# Patient Record
Sex: FEMALE | Race: OTHER | URBAN - METROPOLITAN AREA
[De-identification: names, ages, dates, MRNs, and addresses within clinical notes are randomized per-mention and may not be internally consistent; named-entity substitution may affect disease eponyms.]

---

## 2022-03-19 ENCOUNTER — INPATIENT (INPATIENT)
Facility: HOSPITAL | Age: 52
LOS: 1 days | Discharge: HOME | End: 2022-03-21
Attending: PSYCHIATRY & NEUROLOGY | Admitting: PSYCHIATRY & NEUROLOGY
Payer: COMMERCIAL

## 2022-03-19 VITALS
OXYGEN SATURATION: 100 % | HEART RATE: 70 BPM | WEIGHT: 160.06 LBS | DIASTOLIC BLOOD PRESSURE: 76 MMHG | SYSTOLIC BLOOD PRESSURE: 120 MMHG | TEMPERATURE: 98 F | RESPIRATION RATE: 18 BRPM

## 2022-03-19 DIAGNOSIS — Z78.9 OTHER SPECIFIED HEALTH STATUS: Chronic | ICD-10-CM

## 2022-03-19 LAB
ALBUMIN SERPL ELPH-MCNC: 4.7 G/DL — SIGNIFICANT CHANGE UP (ref 3.5–5.2)
ALP SERPL-CCNC: 75 U/L — SIGNIFICANT CHANGE UP (ref 30–115)
ALT FLD-CCNC: 14 U/L — SIGNIFICANT CHANGE UP (ref 0–41)
ANION GAP SERPL CALC-SCNC: 13 MMOL/L — SIGNIFICANT CHANGE UP (ref 7–14)
APPEARANCE UR: CLEAR — SIGNIFICANT CHANGE UP
APTT BLD: 32 SEC — SIGNIFICANT CHANGE UP (ref 27–39.2)
AST SERPL-CCNC: 15 U/L — SIGNIFICANT CHANGE UP (ref 0–41)
BACTERIA # UR AUTO: NEGATIVE — SIGNIFICANT CHANGE UP
BASOPHILS # BLD AUTO: 0.03 K/UL — SIGNIFICANT CHANGE UP (ref 0–0.2)
BASOPHILS NFR BLD AUTO: 0.5 % — SIGNIFICANT CHANGE UP (ref 0–1)
BILIRUB SERPL-MCNC: 0.4 MG/DL — SIGNIFICANT CHANGE UP (ref 0.2–1.2)
BILIRUB UR-MCNC: NEGATIVE — SIGNIFICANT CHANGE UP
BUN SERPL-MCNC: 14 MG/DL — SIGNIFICANT CHANGE UP (ref 10–20)
CALCIUM SERPL-MCNC: 9.6 MG/DL — SIGNIFICANT CHANGE UP (ref 8.5–10.1)
CHLORIDE SERPL-SCNC: 104 MMOL/L — SIGNIFICANT CHANGE UP (ref 98–110)
CO2 SERPL-SCNC: 24 MMOL/L — SIGNIFICANT CHANGE UP (ref 17–32)
COLOR SPEC: SIGNIFICANT CHANGE UP
CREAT SERPL-MCNC: 0.7 MG/DL — SIGNIFICANT CHANGE UP (ref 0.7–1.5)
DIFF PNL FLD: ABNORMAL
EGFR: 105 ML/MIN/1.73M2 — SIGNIFICANT CHANGE UP
EOSINOPHIL # BLD AUTO: 0.12 K/UL — SIGNIFICANT CHANGE UP (ref 0–0.7)
EOSINOPHIL NFR BLD AUTO: 1.9 % — SIGNIFICANT CHANGE UP (ref 0–8)
EPI CELLS # UR: 5 /HPF — SIGNIFICANT CHANGE UP (ref 0–5)
GLUCOSE SERPL-MCNC: 97 MG/DL — SIGNIFICANT CHANGE UP (ref 70–99)
GLUCOSE UR QL: NEGATIVE — SIGNIFICANT CHANGE UP
HCT VFR BLD CALC: 42.8 % — SIGNIFICANT CHANGE UP (ref 37–47)
HGB BLD-MCNC: 13.9 G/DL — SIGNIFICANT CHANGE UP (ref 12–16)
HYALINE CASTS # UR AUTO: 4 /LPF — SIGNIFICANT CHANGE UP (ref 0–7)
IMM GRANULOCYTES NFR BLD AUTO: 0.3 % — SIGNIFICANT CHANGE UP (ref 0.1–0.3)
INR BLD: 1.02 RATIO — SIGNIFICANT CHANGE UP (ref 0.65–1.3)
KETONES UR-MCNC: NEGATIVE — SIGNIFICANT CHANGE UP
LEUKOCYTE ESTERASE UR-ACNC: NEGATIVE — SIGNIFICANT CHANGE UP
LYMPHOCYTES # BLD AUTO: 1.47 K/UL — SIGNIFICANT CHANGE UP (ref 1.2–3.4)
LYMPHOCYTES # BLD AUTO: 22.8 % — SIGNIFICANT CHANGE UP (ref 20.5–51.1)
MCHC RBC-ENTMCNC: 26.6 PG — LOW (ref 27–31)
MCHC RBC-ENTMCNC: 32.5 G/DL — SIGNIFICANT CHANGE UP (ref 32–37)
MCV RBC AUTO: 81.8 FL — SIGNIFICANT CHANGE UP (ref 81–99)
MONOCYTES # BLD AUTO: 0.25 K/UL — SIGNIFICANT CHANGE UP (ref 0.1–0.6)
MONOCYTES NFR BLD AUTO: 3.9 % — SIGNIFICANT CHANGE UP (ref 1.7–9.3)
NEUTROPHILS # BLD AUTO: 4.57 K/UL — SIGNIFICANT CHANGE UP (ref 1.4–6.5)
NEUTROPHILS NFR BLD AUTO: 70.6 % — SIGNIFICANT CHANGE UP (ref 42.2–75.2)
NITRITE UR-MCNC: NEGATIVE — SIGNIFICANT CHANGE UP
NRBC # BLD: 0 /100 WBCS — SIGNIFICANT CHANGE UP (ref 0–0)
PH UR: 7.5 — SIGNIFICANT CHANGE UP (ref 5–8)
PLATELET # BLD AUTO: 273 K/UL — SIGNIFICANT CHANGE UP (ref 130–400)
POTASSIUM SERPL-MCNC: 4 MMOL/L — SIGNIFICANT CHANGE UP (ref 3.5–5)
POTASSIUM SERPL-SCNC: 4 MMOL/L — SIGNIFICANT CHANGE UP (ref 3.5–5)
PROT SERPL-MCNC: 7.3 G/DL — SIGNIFICANT CHANGE UP (ref 6–8)
PROT UR-MCNC: NEGATIVE — SIGNIFICANT CHANGE UP
PROTHROM AB SERPL-ACNC: 11.7 SEC — SIGNIFICANT CHANGE UP (ref 9.95–12.87)
RBC # BLD: 5.23 M/UL — SIGNIFICANT CHANGE UP (ref 4.2–5.4)
RBC # FLD: 13.3 % — SIGNIFICANT CHANGE UP (ref 11.5–14.5)
RBC CASTS # UR COMP ASSIST: 2 /HPF — SIGNIFICANT CHANGE UP (ref 0–4)
SARS-COV-2 RNA SPEC QL NAA+PROBE: SIGNIFICANT CHANGE UP
SODIUM SERPL-SCNC: 141 MMOL/L — SIGNIFICANT CHANGE UP (ref 135–146)
SP GR SPEC: 1.01 — SIGNIFICANT CHANGE UP (ref 1.01–1.03)
UROBILINOGEN FLD QL: SIGNIFICANT CHANGE UP
WBC # BLD: 6.46 K/UL — SIGNIFICANT CHANGE UP (ref 4.8–10.8)
WBC # FLD AUTO: 6.46 K/UL — SIGNIFICANT CHANGE UP (ref 4.8–10.8)
WBC UR QL: 4 /HPF — SIGNIFICANT CHANGE UP (ref 0–5)

## 2022-03-19 PROCEDURE — 70498 CT ANGIOGRAPHY NECK: CPT | Mod: 26,MA

## 2022-03-19 PROCEDURE — 70496 CT ANGIOGRAPHY HEAD: CPT | Mod: 26,MA

## 2022-03-19 PROCEDURE — 99283 EMERGENCY DEPT VISIT LOW MDM: CPT

## 2022-03-19 PROCEDURE — 99220: CPT | Mod: FS

## 2022-03-19 RX ORDER — ACETAMINOPHEN 500 MG
975 TABLET ORAL ONCE
Refills: 0 | Status: COMPLETED | OUTPATIENT
Start: 2022-03-19 | End: 2022-03-19

## 2022-03-19 RX ORDER — KETOROLAC TROMETHAMINE 30 MG/ML
15 SYRINGE (ML) INJECTION ONCE
Refills: 0 | Status: DISCONTINUED | OUTPATIENT
Start: 2022-03-19 | End: 2022-03-19

## 2022-03-19 RX ORDER — SODIUM CHLORIDE 9 MG/ML
1000 INJECTION, SOLUTION INTRAVENOUS
Refills: 0 | Status: DISCONTINUED | OUTPATIENT
Start: 2022-03-19 | End: 2022-03-21

## 2022-03-19 RX ADMIN — SODIUM CHLORIDE 500 MILLILITER(S): 9 INJECTION, SOLUTION INTRAVENOUS at 13:03

## 2022-03-19 RX ADMIN — Medication 15 MILLIGRAM(S): at 20:16

## 2022-03-19 RX ADMIN — Medication 975 MILLIGRAM(S): at 15:30

## 2022-03-19 RX ADMIN — Medication 975 MILLIGRAM(S): at 16:30

## 2022-03-19 RX ADMIN — Medication 15 MILLIGRAM(S): at 20:01

## 2022-03-19 NOTE — ED ADULT NURSE NOTE - OBJECTIVE STATEMENT
pt came to ed c/o of pain in both eyes  that has lasted for a while. last night pt started to have n/v with the pain . stated had fevers in the week as well.

## 2022-03-19 NOTE — ED PROVIDER NOTE - PHYSICAL EXAMINATION
CONSTITUTIONAL: NAD  SKIN: Warm dry  HEAD: NCAT  EYES: NL inspection, no conjunctival erythema, 20/100 vision b/l, 14mm IOP rt, 9mm IOP left.   ENT: MMM  NECK: Supple; non tender.  CARD: RRR  RESP: CTAB  ABD: S/NT no R/G  EXT: no pedal edema  NEURO: Grossly unremarkable  PSYCH: Cooperative, appropriate.

## 2022-03-19 NOTE — CONSULT NOTE ADULT - SUBJECTIVE AND OBJECTIVE BOX
Neurology Consult    Patient is a 52y old  Female who presents with a chief complaint of bilateral eye pain    HPI: 52 year old F lady with PMH of psoriasis, elevated GUERLINE, following with rheumatology recently, presented for intermitted bilateral eye pain.  Symptoms has been going on for almost a year, worsening over the last 2 months ago. Patient follows with ophthalmologist Dr. Belcher at [facility], started on Flarex eye drops 0.1 %. This morning, patient's pain was very severe, woke her up from sleep 20/10 in intensity associated with nausea headache, and diplopia. She also had bilateral numbness over cheeks and the tip of her  nose.  When seen in ED, pain is better, 8/10 in intensity, associated with photophobia. Patient also mentioned she feels her vision was blurry, and her ophthalmologist attribute that to Flarex which she was tapering over the last 2 months.  She denies weakness, tingling, numbness, gait changes, bowel or bladder symptoms.      PAST MEDICAL & SURGICAL HISTORY:  Psoriasis        FAMILY HISTORY:  None      Social History: (-) x 3    Allergies    penicillin (Unknown)    Intolerances        MEDICATIONS  (STANDING):  lactated ringers. 1000 milliLiter(s) (500 mL/Hr) IV Continuous <Continuous>    MEDICATIONS  (PRN):      Review of systems:    Constitutional: as per HPI  Eyes: No eye pain or discharge  ENMT:  No difficulty hearing; No sinus or throat pain  Neck: No pain or stiffness  Respiratory: No cough, wheezing, chills or hemoptysis  Cardiovascular: No chest pain, palpitations, shortness of breath, dyspnea on exertion  Gastrointestinal: No abdominal pain, nausea, vomiting or hematemesis; No diarrhea or constipation.   Genitourinary: No dysuria, frequency, hematuria or incontinence  Neurological: As per HPI  Skin: No rashes or lesions   Endocrine: No heat or cold intolerance; No hair loss  Musculoskeletal: No joint pain or swelling  Psychiatric: No depression, anxiety, mood swings  Heme/Lymph: No easy bruising or bleeding gums    Vital Signs Last 24 Hrs  T(C): 36.6 (19 Mar 2022 10:44), Max: 36.6 (19 Mar 2022 10:44)  T(F): 97.9 (19 Mar 2022 10:44), Max: 97.9 (19 Mar 2022 10:44)  HR: 70 (19 Mar 2022 10:44) (70 - 70)  BP: 120/76 (19 Mar 2022 10:44) (120/76 - 120/76)  RR: 18 (19 Mar 2022 10:44) (18 - 18)  SpO2: 100% (19 Mar 2022 10:44) (100% - 100%)      Neurological Examination:  General:  Appearance is consistent with chronologic age.  No abnormal facies.  Gross skin survey within normal limits.    Cognitive/Language:  Awake, alert, and oriented to person, place, time and date.  Recent and remote memory intact.  Fund of knowledge is appropriate.  Naming, repetition and comprehension intact.   Nondysarthric.    Cranial Nerves  - Eyes: Visual acuity intact, Visual fields full.  EOMI w/o nystagmus, skew or reported double vision.  PERRL.  No ptosis/weakness of eyelid closure.    - Face:  Facial sensation normal V1 - 3, no facial asymmetry.    - Ears/Nose/Throat:  Hearing grossly intact b/l to finger rub.  Palate elevates midline.  Tongue and uvula midline.   Motor examination:  Upper Extremities: L 5/5, R 5/5; Lower extremities: L 5/5, R 5/5.  No observable drift. Normal tone and bulk. No tenderness, twitching, tremors or involuntary movements.  Sensory examination:   Intact to light touch and pinprick, pain, temperature and proprioception and vibration in all extremities.  Reflexes:   2+ b/l biceps, triceps, brachioradialis, patella and achilles.  Plantar response downgoing b/l.  Jaw jerk, Sharron, clonus absent.  Cerebellum:   FTN/HKS intact.  No dysmetria or dysdiadokinesia.  Gait narrow based and normal.      Labs:   CBC Full  -  ( 19 Mar 2022 12:00 )  WBC Count : 6.46 K/uL  RBC Count : 5.23 M/uL  Hemoglobin : 13.9 g/dL  Hematocrit : 42.8 %  Platelet Count - Automated : 273 K/uL  Mean Cell Volume : 81.8 fL  Mean Cell Hemoglobin : 26.6 pg  Mean Cell Hemoglobin Concentration : 32.5 g/dL  Auto Neutrophil # : 4.57 K/uL  Auto Lymphocyte # : 1.47 K/uL  Auto Monocyte # : 0.25 K/uL  Auto Eosinophil # : 0.12 K/uL  Auto Basophil # : 0.03 K/uL  Auto Neutrophil % : 70.6 %  Auto Lymphocyte % : 22.8 %  Auto Monocyte % : 3.9 %  Auto Eosinophil % : 1.9 %  Auto Basophil % : 0.5 %        141  |  104  |  14  ----------------------------<  97  4.0   |  24  |  0.7    Ca    9.6      19 Mar 2022 12:00    TPro  7.3  /  Alb  4.7  /  TBili  0.4  /  DBili  x   /  AST  15  /  ALT  14  /  AlkPhos  75      LIVER FUNCTIONS - ( 19 Mar 2022 12:00 )  Alb: 4.7 g/dL / Pro: 7.3 g/dL / ALK PHOS: 75 U/L / ALT: 14 U/L / AST: 15 U/L / GGT: x           PT/INR - ( 19 Mar 2022 12:00 )   PT: 11.70 sec;   INR: 1.02 ratio         PTT - ( 19 Mar 2022 12:00 )  PTT:32.0 sec  Urinalysis Basic - ( 19 Mar 2022 13:00 )    Color: Light Yellow / Appearance: Clear / S.014 / pH: x  Gluc: x / Ketone: Negative  / Bili: Negative / Urobili: <2 mg/dL   Blood: x / Protein: Negative / Nitrite: Negative   Leuk Esterase: Negative / RBC: 2 /HPF / WBC 4 /HPF   Sq Epi: x / Non Sq Epi: 5 /HPF / Bacteria: Negative          Neuroimaging:  NCHCT: CT Head No Cont:   ACC: [id number] EXAM:  CT ANGIO NECK (W) IC                        ACC: [id number] EXAM:  CT ANGIO BRAIN (W) IC                        ACC: [id number] EXAM:  CT BRAIN                          PROCEDURE DATE:  2022          INTERPRETATION:  CLINICAL INDICATION: Diplopia. Ocular pain.    TECHNIQUE: Noncontrast head CT was performed. Sagittal coronal   reformatted images were obtained.    CT angiography of the intracranial (head) and extracranial (neck)   circulation was performed after contrast administration    Maximal intensity projection images were additionally included and   reviewed.    100 mls of Omnipaque 350 was administered intravenously without   complication and 0 mls were discarded.    Using a separate workstation, 3-D shadedsurface reformations were made   of vasculature. 3-D reformations were reviewed and included in   interpretation of the official report.    CAROTID STENOSIS REFERENCE: (NASCET = 100x1-(N/D)). N=greatest narrowing.   D=normal distal diameter - MILD = <50% stenosis. - MODERATE = 50-69%   stenosis. - SEVERE = 70-89% stenosis. - HAIRLINE/CRITICAL = 90-99%   stenosis. - OCCLUDED = 100% stenosis.    COMPARISON: None.    FINDINGS:  CT BRAIN:    There is no evidence for acute intracranial hemorrhage, mass effect or   midline shift.    The basal cisterns are patent. There is no hydrocephalus.    There is no extra-axial collection.    The visualized orbits are unremarkable.    The calvarium is intact, without depressed fracture.    There is moderate mucosal thickening of the right sphenoid sinus, ethmoid   air cells as well as the right frontal sinus. The mastoid air cells are   clear.    HEAD CTA:    The internal carotid arteries demonstrate normal enhancement to the   intracranial circulation andcircle of [place]. Ophthalmic arteries are   patent.    Anterior and middle cerebral arteries demonstrate normal enhancement and   symmetric arborization without intraluminal filling defect, stenosis,   occlusion, aneurysm or vascular malformation.    The intradural vertebral arteries demonstrate normal enhancement to the   vertebrobasilar confluence. Branch vasculature of the posterior   circulation are within normal limits. The posterior cerebral arteries   demonstrate symmetric enhancement and arborization without evidence for   aneurysm, stenosis, occlusion or vascular malformation.    Visualized dural venous sinuses and deep cerebral venous structures   demonstrate normal enhancement without evidence for filling defect.    NECK CTA:    Streak artifact from metallic necklace limits evaluation of the   surrounding structures    The aortic arch and origins of the great vessels are within normal limits.    Right:  The origin of the right common carotid artery is normal. The   right commoncarotid artery is normal in course and caliber to the   carotid bifurcation. Origins of the internal and external carotid   arteries are normal by NASCET criteria. The right internal carotid artery   has normal course and caliber to the intracranial circulation.    The origin of the right vertebral artery is normal. The right PCA is   diminutive along its course and terminates in PICA.    Left: The origin of the left common carotid artery is normal. The left   common carotid artery is normal in course and caliber to the carotid   bifurcation. Origins of the internal and external carotid arteries are   normal by NASCET criteria. The left internal carotid artery has normal   course and caliber to the intracranial circulation.    Vertebral artery originates off the aortic arch and is unremarkable. The   left vertebral artery is normal in course and caliber to the intracranial   circulation.    IMPRESSION:  CT HEAD:  No acute intracranial pathology. No evidence of midline shift, mass   effector intracranial hemorrhage.    CTA HEAD:  No evidence of flow-limiting stenosis, occlusion or aneurysm.    CTA NECK:  No evidence of carotid or vertebral artery stenosis.           Neurology Consult    Patient is a 52y old  Female who presents with a chief complaint of bilateral eye pain    HPI: 52 year old F lady with PMH of psoriasis, elevated GUERLINE, following with rheumatology recently, presented for intermitted bilateral eye pain.  Symptoms has been going on for almost a year, worsening over the last 2 months ago. Patient follows with ophthalmologist Dr. Belcher at [facility], started on Flarex eye drops 0.1 %. This morning, patient's pain was very severe, woke her up from sleep 20/10 in intensity associated with nausea headache, and diplopia. She also had bilateral numbness over cheeks and the tip of her  nose.  When seen in ED, pain is better, 8/10 in intensity, associated with photophobia. Patient also mentioned she feels her vision was blurry, and her ophthalmologist attribute that to Flarex which she was tapering over the last 2 months.  She denies weakness, tingling, numbness, gait changes, bowel or bladder symptoms.      PAST MEDICAL & SURGICAL HISTORY:  Psoriasis        FAMILY HISTORY:  None      Social History: (-) x 3    Allergies    penicillin (Unknown)    Intolerances        MEDICATIONS  (STANDING):  lactated ringers. 1000 milliLiter(s) (500 mL/Hr) IV Continuous <Continuous>    MEDICATIONS  (PRN):      Review of systems:    Constitutional: as per HPI  Eyes: Eye pain  ENMT: sinus pain  Neck: No pain or stiffness  Respiratory: No cough, wheezing, chills or hemoptysis  Cardiovascular: No chest pain, palpitations, shortness of breath, dyspnea on exertion  Gastrointestinal: No abdominal pain, nausea, vomiting or hematemesis; No diarrhea or constipation.   Genitourinary: No dysuria, frequency, hematuria or incontinence  Neurological: As per HPI  Skin: No rashes or lesions   Endocrine: No heat or cold intolerance; No hair loss  Musculoskeletal: No joint pain or swelling  Psychiatric: No depression, anxiety, mood swings  Heme/Lymph: No easy bruising or bleeding gums    Vital Signs Last 24 Hrs  T(C): 36.6 (19 Mar 2022 10:44), Max: 36.6 (19 Mar 2022 10:44)  T(F): 97.9 (19 Mar 2022 10:44), Max: 97.9 (19 Mar 2022 10:44)  HR: 70 (19 Mar 2022 10:44) (70 - 70)  BP: 120/76 (19 Mar 2022 10:44) (120/76 - 120/76)  RR: 18 (19 Mar 2022 10:44) (18 - 18)  SpO2: 100% (19 Mar 2022 10:44) (100% - 100%)      Neurological Examination:  General:  Appearance is consistent with chronologic age.  No abnormal facies.  Gross skin survey within normal limits.    Cognitive/Language:  Awake, alert, and oriented to person, place, time and date.  Recent and remote memory intact.  Fund of knowledge is appropriate.  Naming, repetition and comprehension intact.   Nondysarthric.    Cranial Nerves  - Eyes: Visual acuity intact, Visual fields full.  EOMI w/o nystagmus, skew or reported double vision.  PERRL.  No ptosis/weakness of eyelid closure.  APD +ve to dim light  - Face:  Facial sensation normal V1 - 3, no facial asymmetry.    - Ears/Nose/Throat:  Hearing grossly intact b/l to finger rub.  Palate elevates midline.  Tongue and uvula midline.   Motor examination:  Upper Extremities: L 5/5, R 5/5; Lower extremities: L 5/5, R 5/5.  No observable drift. Normal tone and bulk. No tenderness, twitching, tremors or involuntary movements.  Sensory examination:   Intact to light touch and pinprick, pain, temperature and proprioception and vibration in all extremities.  Reflexes:   2+ b/l biceps, triceps, brachioradialis, patella and achilles.  Plantar response downgoing b/l.  Jaw jerk, Sharron, clonus absent.  Cerebellum:   FTN/HKS intact.  No dysmetria or dysdiadokinesia.  Gait narrow based and normal.      Labs:   CBC Full  -  ( 19 Mar 2022 12:00 )  WBC Count : 6.46 K/uL  RBC Count : 5.23 M/uL  Hemoglobin : 13.9 g/dL  Hematocrit : 42.8 %  Platelet Count - Automated : 273 K/uL  Mean Cell Volume : 81.8 fL  Mean Cell Hemoglobin : 26.6 pg  Mean Cell Hemoglobin Concentration : 32.5 g/dL  Auto Neutrophil # : 4.57 K/uL  Auto Lymphocyte # : 1.47 K/uL  Auto Monocyte # : 0.25 K/uL  Auto Eosinophil # : 0.12 K/uL  Auto Basophil # : 0.03 K/uL  Auto Neutrophil % : 70.6 %  Auto Lymphocyte % : 22.8 %  Auto Monocyte % : 3.9 %  Auto Eosinophil % : 1.9 %  Auto Basophil % : 0.5 %        141  |  104  |  14  ----------------------------<  97  4.0   |  24  |  0.7    Ca    9.6      19 Mar 2022 12:00    TPro  7.3  /  Alb  4.7  /  TBili  0.4  /  DBili  x   /  AST  15  /  ALT  14  /  AlkPhos  75      LIVER FUNCTIONS - ( 19 Mar 2022 12:00 )  Alb: 4.7 g/dL / Pro: 7.3 g/dL / ALK PHOS: 75 U/L / ALT: 14 U/L / AST: 15 U/L / GGT: x           PT/INR - ( 19 Mar 2022 12:00 )   PT: 11.70 sec;   INR: 1.02 ratio         PTT - ( 19 Mar 2022 12:00 )  PTT:32.0 sec  Urinalysis Basic - ( 19 Mar 2022 13:00 )    Color: Light Yellow / Appearance: Clear / S.014 / pH: x  Gluc: x / Ketone: Negative  / Bili: Negative / Urobili: <2 mg/dL   Blood: x / Protein: Negative / Nitrite: Negative   Leuk Esterase: Negative / RBC: 2 /HPF / WBC 4 /HPF   Sq Epi: x / Non Sq Epi: 5 /HPF / Bacteria: Negative          Neuroimaging:  NCHCT: CT Head No Cont:   ACC: [id number] EXAM:  CT ANGIO NECK (W) IC                        ACC: [id number] EXAM:  CT ANGIO BRAIN (W)[facility]                        ACC: [id number] EXAM:  CT BRAIN                          PROCEDURE DATE:  2022          INTERPRETATION:  CLINICAL INDICATION: Diplopia. Ocular pain.    TECHNIQUE: Noncontrast head CT was performed. Sagittal coronal   reformatted images were obtained.    CT angiography of the intracranial (head) and extracranial (neck)   circulation was performed after contrast administration    Maximal intensity projection images were additionally included and   reviewed.    100 mls of Omnipaque 350 was administered intravenously without   complication and 0 mls were discarded.    Using a separate workstation, 3-D shadedsurface reformations were made   of vasculature. 3-D reformations were reviewed and included in   interpretation of the official report.    CAROTID STENOSIS REFERENCE: (NASCET = 100x1-(N/D)). N=greatest narrowing.   D=normal distal diameter - MILD = <50% stenosis. - MODERATE = 50-69%   stenosis. - SEVERE = 70-89% stenosis. - HAIRLINE/CRITICAL = 90-99%   stenosis. - OCCLUDED = 100% stenosis.    COMPARISON: None.    FINDINGS:  CT BRAIN:    There is no evidence for acute intracranial hemorrhage, mass effect or   midline shift.    The basal cisterns are patent. There is no hydrocephalus.    There is no extra-axial collection.    The visualized orbits are unremarkable.    The calvarium is intact, without depressed fracture.    There is moderate mucosal thickening of the right sphenoid sinus, ethmoid   air cells as well as the right frontal sinus. The mastoid air cells are   clear.    HEAD CTA:    The internal carotid arteries demonstrate normal enhancement to the   intracranial circulation andcircle of [place]. Ophthalmic arteries are   patent.    Anterior and middle cerebral arteries demonstrate normal enhancement and   symmetric arborization without intraluminal filling defect, stenosis,   occlusion, aneurysm or vascular malformation.    The intradural vertebral arteries demonstrate normal enhancement to the   vertebrobasilar confluence. Branch vasculature of the posterior   circulation are within normal limits. The posterior cerebral arteries   demonstrate symmetric enhancement and arborization without evidence for   aneurysm, stenosis, occlusion or vascular malformation.    Visualized dural venous sinuses and deep cerebral venous structures   demonstrate normal enhancement without evidence for filling defect.    NECK CTA:    Streak artifact from metallic necklace limits evaluation of the   surrounding structures    The aortic arch and origins of the great vessels are within normal limits.    Right:  The origin of the right common carotid artery is normal. The   right commoncarotid artery is normal in course and caliber to the   carotid bifurcation. Origins of the internal and external carotid   arteries are normal by NASCET criteria. The right internal carotid artery   has normal course and caliber to the intracranial circulation.    The origin of the right vertebral artery is normal. The right PCA is   diminutive along its course and terminates in PICA.    Left: The origin of the left common carotid artery is normal. The left   common carotid artery is normal in course and caliber to the carotid   bifurcation. Origins of the internal and external carotid arteries are   normal by NASCET criteria. The left internal carotid artery has normal   course and caliber to the intracranial circulation.    Vertebral artery originates off the aortic arch and is unremarkable. The   left vertebral artery is normal in course and caliber to the intracranial   circulation.    IMPRESSION:  CT HEAD:  No acute intracranial pathology. No evidence of midline shift, mass   effector intracranial hemorrhage.    CTA HEAD:  No evidence of flow-limiting stenosis, occlusion or aneurysm.    CTA NECK:  No evidence of carotid or vertebral artery stenosis.

## 2022-03-19 NOTE — ED CDU PROVIDER INITIAL DAY NOTE - PROGRESS NOTE DETAILS
received signout from Iban Biggs - pt in obs for bilat eye pain x 1 yr worse x 2 months, now with diplopia; neurology consulted - recommend mri of brain and orbits to r/o optic neuritis;  ct head, cta head/neck neg;

## 2022-03-19 NOTE — ED CDU PROVIDER INITIAL DAY NOTE - ATTENDING CONTRIBUTION TO CARE
Pt presents with a one year history of intermittent pain to the eyes and periorbital area. Pt was seen by opthalmology and treated with eye drops. Currently using Flarex without benefit. NO other neuro complaints On exam S1S2 rrr, no rash, pain on EOM to both eyes.

## 2022-03-19 NOTE — ED CDU PROVIDER INITIAL DAY NOTE - NSICDXFAMILYHX_GEN_ALL_CORE_FT
FAMILY HISTORY:  Father  Still living? No  FH: CAD (coronary artery disease), Age at diagnosis: Age Unknown    Mother  Still living? No  FH: CAD (coronary artery disease), Age at diagnosis: Age Unknown

## 2022-03-19 NOTE — CONSULT NOTE ADULT - ASSESSMENT
52 year old F lady with PMH of psoriasis, elevated GUERLINE, following with rheumatology recently, presented for intermitted bilateral eye pain.  Symptoms has been going on for almost a year, worsening over the last 2 months ago. Patient follows with ophthalmologist Dr. Belcher at [facility], started on Flarex eye drops 0.1 %. This morning, patient's pain was very severe, woke her up from sleep 20/10 in intensity associated with nausea headache, and diplopia. She also had bilateral numbness over cheeks and the tip of her  nose.  When seen in ED, pain is better, 8/10 in intensity, associated with photophobia. Patient also mentioned she feels her vision was blurry, and her ophthalmologist attribute that to Flarex which she was tapering over the last 2 months.  She denies weakness, tingling, numbness, gait changes, bowel or bladder symptoms.    B/l intermittent eye pain- R/o optic neuritis  -         52 year old F lady with PMH of psoriasis, elevated GUERLINE, following with rheumatology recently, presented for intermitted bilateral eye pain, worsening over the last 2 months ago, associated with nausea headache, and diplopia. She also had bilateral numbness over cheeks and the tip of her  nose.  Exam notable to     B/l intermittent eye pain- R/o optic neuritis  -         52 year old F lady with PMH of psoriasis, elevated GUERLINE, following with rheumatology recently, presented for intermitted bilateral eye pain, worsening over the last 2 months ago, associated with nausea headache, and diplopia. She also had bilateral numbness over cheeks and the tip of her  nose.  Exam notable to to +APD, otherwise unremarkable    B/l intermittent eye pain  - R/o optic neuritis  -Admit to observation unit  -MRI brain w/wo contrast  -MR orbit w/wo contrast    Plan discussed with attending and ED team

## 2022-03-19 NOTE — ED ADULT NURSE REASSESSMENT NOTE - NS ED NURSE REASSESS COMMENT FT1
Pt assessed A&Ox4, VSS. Pt complains of B/L eye pain, denies nausea. PA Fearns notified and aware. Pt resting on stretcher, awaiting MRI. Safety and comfort measures maintained. Will continue to monitor

## 2022-03-19 NOTE — ED PROVIDER NOTE - OBJECTIVE STATEMENT
52 y f, no pmh, pw bl eye pain. Started 1 year ago, intermittent, becoming constant 2 months ago, sees ophthalmologist Dr. Belcher at [facility]. yesterday, pain is much worse than before, 10/10, ass w nausea and headache, +diplopia, but EOMI.  No numbness, weakness, tingling, parasthesia. Denies f/c, cp, sob, n/v/d, abd pain dysuria. of note, pt is GUERLINE positive. Sees a rheumatologist but does not have definitive diagnosis.

## 2022-03-19 NOTE — ED PROVIDER NOTE - PROGRESS NOTE DETAILS
Maribell Dill endorsed pt can f/u outpatient. BG: Pt reassessed. Pt. stable and comfortable in bed. BG: Pt reassessed. Pt. stable and comfortable in bed. Still endorses pain. Wants something for pain BG: Pt reassessed. Pt. stable and comfortable in bed. neuro endorsed to put pt in obs for MRI

## 2022-03-19 NOTE — ED PROVIDER NOTE - ATTENDING CONTRIBUTION TO CARE
52F pmh HL, bilat eye pain x >1 yr follows w ophtho Dr Robertson and rheum Dr Liu, on florex eye drops p/w worsening bilat eye pain since last night. associated w 1 episode nbnb emesis. eye pain radiates to Head. no numbness, weakness, tingling. no blurry vision, slurred speech, trouble walking. no cp, sob. no fever, cough, uri.    on exam, AFVSS, well miguel nad, ncat, eomi, perrla, mmm, lctab, rrr nl s1s2 no mrg, abd soft ntnd, aaox3, CN 2-12 intact, No nystagmus.  5/5 motor x 4 ext, SILT x 4 extremities, No facial droop or slurred speech. No pronator drift.  Normal rapid alternating movement and finger nose finger bilaterally. No midline C/T/L tenderness to palpation or step off. Normal gait, No ataxia.   no le edema or calf ttp,     a/p; Eye pain/HA- will do labs, CT scan, neuro/ophtho c/s re-eval

## 2022-03-19 NOTE — ED CDU PROVIDER INITIAL DAY NOTE - MEDICAL DECISION MAKING DETAILS
Pt presents with bilateral eye pains and blurred vision. CTs head normal. Evaluated by neuro >Advised MRI head

## 2022-03-19 NOTE — ED ADULT TRIAGE NOTE - CHIEF COMPLAINT QUOTE
"im having pain in both eyes , its been happening a while im seeing an eye doctor for it. last night the pain came with nausea and vomiting. for a week khalida been running fevers also"

## 2022-03-19 NOTE — ED CDU PROVIDER INITIAL DAY NOTE - OBJECTIVE STATEMENT
52 y.o. female with pmx of psoriasis, elevated yoshi comes to ed complain of worsening b/l eye pain x 1 year getting worse x 2 months. patient sees dr. villalta ophthalmologist. as per initial ed team mouna norman spoke to opthalmology states outpatient workup, spoke to neurology and evaluated recommended transfer to obs for mri of head and orbit with and without.

## 2022-03-20 PROCEDURE — 70540 MRI ORBIT/FACE/NECK W/O DYE: CPT | Mod: 26,52,ME

## 2022-03-20 PROCEDURE — 70551 MRI BRAIN STEM W/O DYE: CPT | Mod: 26,ME

## 2022-03-20 PROCEDURE — G1004: CPT

## 2022-03-20 PROCEDURE — 99217: CPT | Mod: FS

## 2022-03-20 RX ORDER — ACETAMINOPHEN 500 MG
650 TABLET ORAL EVERY 6 HOURS
Refills: 0 | Status: DISCONTINUED | OUTPATIENT
Start: 2022-03-20 | End: 2022-03-21

## 2022-03-20 RX ORDER — OXYCODONE AND ACETAMINOPHEN 5; 325 MG/1; MG/1
1 TABLET ORAL ONCE
Refills: 0 | Status: DISCONTINUED | OUTPATIENT
Start: 2022-03-20 | End: 2022-03-20

## 2022-03-20 RX ORDER — LANOLIN ALCOHOL/MO/W.PET/CERES
3 CREAM (GRAM) TOPICAL AT BEDTIME
Refills: 0 | Status: DISCONTINUED | OUTPATIENT
Start: 2022-03-20 | End: 2022-03-21

## 2022-03-20 RX ORDER — ONDANSETRON 8 MG/1
4 TABLET, FILM COATED ORAL EVERY 8 HOURS
Refills: 0 | Status: DISCONTINUED | OUTPATIENT
Start: 2022-03-20 | End: 2022-03-21

## 2022-03-20 RX ADMIN — OXYCODONE AND ACETAMINOPHEN 1 TABLET(S): 5; 325 TABLET ORAL at 18:18

## 2022-03-20 RX ADMIN — OXYCODONE AND ACETAMINOPHEN 1 TABLET(S): 5; 325 TABLET ORAL at 04:41

## 2022-03-20 RX ADMIN — OXYCODONE AND ACETAMINOPHEN 1 TABLET(S): 5; 325 TABLET ORAL at 17:41

## 2022-03-20 RX ADMIN — Medication 1 MILLIGRAM(S): at 15:10

## 2022-03-20 NOTE — H&P ADULT - HISTORY OF PRESENT ILLNESS
52 year old F lady with PMH of psoriasis, elevated GUERLINE, following with rheumatology recently, presented for intermitted bilateral eye pain.  Symptoms has been going on for almost a year, worsening over the last 2 months ago. Patient follows with ophthalmologist Dr. Belcher at [facility], started on Flarex eye drops 0.1 %. This morning, patient's pain was very severe, woke her up from sleep 20/10 in intensity associated with nausea headache, and diplopia. She also had bilateral numbness over cheeks and the tip of her  nose.  When seen in ED, pain is better, 8/10 in intensity, associated with photophobia. Patient also mentioned she feels her vision was blurry, and her ophthalmologist attribute that to Flarex which she was tapering over the last 2 months.  She denies weakness, tingling, numbness, gait changes, bowel or bladder symptoms.    in the ED,pt's VS T(C): 36.1 (03-20-22 @ 16:45), Max: 36.5 (03-20-22 @ 00:20)  HR: 65 (03-20-22 @ 16:45) (61 - 71)  BP: 104/64 (03-20-22 @ 16:45) (98/55 - 111/71)  RR: 18 (03-20-22 @ 16:45) (17 - 18)  SpO2: 99% (03-20-22 @ 16:45) (99% - 100%), labs done showed < from: MR Orbit, Face, and/or Neck w/o Cont Disc (03.20.22 @ 16:18) >  Incomplete examination due to patient inability to tolerate full exam.    No evidence of acute intracranial pathology or gross abnormal signal   involving the optic nerves though evaluation of the orbits is suboptimal   without intravenous contrast.    < from: CT Angio Head w/ IV Cont (03.19.22 @ 12:39) >  CT HEAD:  No acute intracranial pathology. No evidence of midline shift, mass   effector intracranial hemorrhage.  CTA HEAD:  No evidence of flow-limiting stenosis, occlusion or aneurysm.  CTA NECK:  No evidence of carotid or vertebral artery stenosis.  pt received lorazepam, oxycodone, ketorolac, 1 liter LR.   pt is admitted for workup/management  52 year old F lady with PMH of psoriasis, elevated GUERLINE, following with rheumatology recently, presented for intermitted bilateral eye pain.  Symptoms has been going on for almost a year, worsening over the last 2 months ago. Patient follows with ophthalmologist Dr. Belcher at [facility], started on Flarex eye drops 0.1 %. This morning, patient's pain was very severe, woke her up from sleep 20/10 in intensity associated with nausea headache, and diplopia. She also had bilateral numbness over cheeks and the tip of her  nose.  When seen in ED, pain is better, 8/10 in intensity, associated with photophobia. Patient also mentioned she feels her vision was blurry, and her ophthalmologist attribute that to Flarex which she was tapering over the last 2 months. her headache was getting better.  She denies weakness, tingling, numbness, gait changes, bowel or bladder symptoms.    in the ED,pt's VS T(C): 36.1 (03-20-22 @ 16:45), Max: 36.5 (03-20-22 @ 00:20)  HR: 65 (03-20-22 @ 16:45) (61 - 71)  BP: 104/64 (03-20-22 @ 16:45) (98/55 - 111/71)  RR: 18 (03-20-22 @ 16:45) (17 - 18)  SpO2: 99% (03-20-22 @ 16:45) (99% - 100%), labs done were unremarkable  from: MR Orbit, Face, and/or Neck w/o Cont Disc (03.20.22 @ 16:18) >  Incomplete examination due to patient inability to tolerate full exam.    No evidence of acute intracranial pathology or gross abnormal signal   involving the optic nerves though evaluation of the orbits is suboptimal   without intravenous contrast.    < from: CT Angio Head w/ IV Cont (03.19.22 @ 12:39) >  CT HEAD:  No acute intracranial pathology. No evidence of midline shift, mass   effector intracranial hemorrhage.  CTA HEAD:  No evidence of flow-limiting stenosis, occlusion or aneurysm.  CTA NECK:  No evidence of carotid or vertebral artery stenosis.  pt received lorazepam, oxycodone, ketorolac, 1 liter LR.   pt is admitted for workup/management

## 2022-03-20 NOTE — ED CDU PROVIDER SUBSEQUENT DAY NOTE - CONDUCTED A DETAILED DISCUSSION WITH PATIENT AND/OR GUARDIAN REGARDING, MDM
lab results/radiology results Bilobed Transposition Flap Text: The defect edges were debeveled with a #15 scalpel blade.  Given the location of the defect and the proximity to free margins a bilobed transposition flap was deemed most appropriate.  Using a sterile surgical marker, an appropriate bilobe flap drawn around the defect.    The area thus outlined was incised deep to adipose tissue with a #15 scalpel blade.  The skin margins were undermined to an appropriate distance in all directions utilizing iris scissors.

## 2022-03-20 NOTE — H&P ADULT - ASSESSMENT
52 year old F lady with PMH of psoriasis, elevated GUERLINE, following with rheumatology recently, presented for intermitted bilateral eye pain.  Symptoms has been going on for almost a year, worsening over the last 2 months ago. Patient follows with ophthalmologist Dr. Belcher at [facility], started on Flarex eye drops 0.1 %. This morning, patient's pain was very severe, woke her up from sleep 20/10 in intensity associated with nausea headache, and diplopia. She also had bilateral numbness over cheeks and the tip of her  nose.  When seen in ED, pain is better, 8/10 in intensity, associated with photophobia. Patient also mentioned she feels her vision was blurry, and her ophthalmologist attribute that to Flarex which she was tapering over the last 2 months. her headache was getting better.  She denies weakness, tingling, numbness, gait changes, bowel or bladder symptoms.    #B/l intermittent eye pain:  # R/o optic neuritis  - labs done were unremarkable   -MR Orbit, Face, and/or Neck w/o Cont Disc (03.20.22 @ 16:18) >  Incomplete examination due to patient inability to tolerate full exam.  No evidence of acute intracranial pathology or gross abnormal signal   involving the optic nerves though evaluation of the orbits is suboptimal   without intravenous contrast.  -CT Angio Head w/ IV Cont (03.19.22 @ 12:39) >  CT HEAD:  No acute intracranial pathology. No evidence of midline shift, mass   effector intracranial hemorrhage.  CTA HEAD:  No evidence of flow-limiting stenosis, occlusion or aneurysm.  CTA NECK:  No evidence of carotid or vertebral artery stenosis.  pt received lorazepam, oxycodone, ketorolac, 1 liter LR.   -MRI brain w/wo contrast  -MR orbit w/wo contrast with anesthesia.   - pt could not tolerate the MRI.   - continue IV fluids.   - follow up with neurology.               52 year old F lady with PMH of psoriasis, elevated GUERLINE, following with rheumatology recently, presented for intermitted bilateral eye pain.  Symptoms has been going on for almost a year, worsening over the last 2 months ago. Patient follows with ophthalmologist Dr. Belcher at [facility], started on Flarex eye drops 0.1 %. This morning, patient's pain was very severe, woke her up from sleep 20/10 in intensity associated with nausea headache, and diplopia. She also had bilateral numbness over cheeks and the tip of her  nose.  When seen in ED, pain is better, 8/10 in intensity, associated with photophobia. Patient also mentioned she feels her vision was blurry, and her ophthalmologist attribute that to Flarex which she was tapering over the last 2 months. her headache was getting better.  She denies weakness, tingling, numbness, gait changes, bowel or bladder symptoms.    #B/l intermittent eye pain:  # R/o optic neuritis  - labs done were unremarkable   -MR Orbit, Face, and/or Neck w/o Cont Disc (03.20.22 @ 16:18) >  Incomplete examination due to patient inability to tolerate full exam.  No evidence of acute intracranial pathology or gross abnormal signal   involving the optic nerves though evaluation of the orbits is suboptimal   without intravenous contrast.  -CT Angio Head w/ IV Cont (03.19.22 @ 12:39) >  CT HEAD:  No acute intracranial pathology. No evidence of midline shift, mass   effector intracranial hemorrhage.  CTA HEAD:  No evidence of flow-limiting stenosis, occlusion or aneurysm.  CTA NECK:  No evidence of carotid or vertebral artery stenosis.  pt received lorazepam, oxycodone, ketorolac, 1 liter LR.   -MRI brain w/wo contrast  -MR orbit w/wo contrast with anesthesia.   - pt could not tolerate the MRI.   - continue IV fluids.   - follow up with neurology.   - FU ophthalmology consult.

## 2022-03-20 NOTE — H&P ADULT - NSHPLABSRESULTS_GEN_ALL_CORE
13.9   6.46  )-----------( 273      ( 19 Mar 2022 12:00 )             42.8           141  |  104  |  14  ----------------------------<  97  4.0   |  24  |  0.7    Ca    9.6      19 Mar 2022 12:00    TPro  7.3  /  Alb  4.7  /  TBili  0.4  /  DBili  x   /  AST  15  /  ALT  14  /  AlkPhos  75                Urinalysis Basic - ( 19 Mar 2022 13:00 )    Color: Light Yellow / Appearance: Clear / S.014 / pH: x  Gluc: x / Ketone: Negative  / Bili: Negative / Urobili: <2 mg/dL   Blood: x / Protein: Negative / Nitrite: Negative   Leuk Esterase: Negative / RBC: 2 /HPF / WBC 4 /HPF   Sq Epi: x / Non Sq Epi: 5 /HPF / Bacteria: Negative        PT/INR - ( 19 Mar 2022 12:00 )   PT: 11.70 sec;   INR: 1.02 ratio         PTT - ( 19 Mar 2022 12:00 )  PTT:32.0 sec    Lactate Trend            CAPILLARY BLOOD GLUCOSE            < from: MR Head No Cont (22 @ 16:17) >      IMPRESSION:  Incomplete examination due to patient inability to tolerate full exam.    No evidence of acute intracranial pathology or gross abnormal signal   involving the optic nerves though evaluation of the orbits is suboptimal   without intravenous contrast.    < end of copied text >    < from: CT Angio Head w/ IV Cont (22 @ 12:39) >    IMPRESSION:  CT HEAD:  No acute intracranial pathology. No evidence of midline shift, mass   effector intracranial hemorrhage.    CTA HEAD:  No evidence of flow-limiting stenosis, occlusion or aneurysm.    CTA NECK:  No evidence of carotid or vertebral artery stenosis.    < end of copied text >

## 2022-03-20 NOTE — ED CDU PROVIDER DISPOSITION NOTE - CLINICAL COURSE
Pt presented with bilateral eye pains and orbital pains. MRI not completed due to pt intolerance. Admitted to medicine for further evaluation. Pt evaluated by neuro.

## 2022-03-20 NOTE — ED ADULT NURSE REASSESSMENT NOTE - NS ED NURSE REASSESS COMMENT FT1
Pt received from previous shit RN, Pt is a/o/4, complaining for eyes pain, iced packed offered with partial pain relieve. No CP/SOB/NAD. Waiting for MRI.

## 2022-03-20 NOTE — H&P ADULT - NSHPPHYSICALEXAM_GEN_ALL_CORE
T(C): 36.1 (03-20-22 @ 16:45), Max: 36.5 (03-20-22 @ 00:20)  HR: 65 (03-20-22 @ 16:45) (61 - 71)  BP: 104/64 (03-20-22 @ 16:45) (98/55 - 111/71)  RR: 18 (03-20-22 @ 16:45) (17 - 18)  SpO2: 99% (03-20-22 @ 16:45) (99% - 100%)    PHYSICAL EXAM:  GENERAL: NAD, well-developed  HEAD:  Atraumatic, Normocephalic  EYES: EOMI, PERRLA, conjunctiva and sclera clear  ENT:No nasal obstruction or discharge. No tonsillar exudate, swelling or erythema.  NECK: Supple, No JVD  CHEST/LUNG: Clear to auscultation bilaterally; No wheeze  HEART: Regular rate and rhythm; No murmurs, rubs, or gallops  ABDOMEN: Soft, Nontender, Nondistended; Bowel sounds present  EXTREMITIES:  2+ Peripheral Pulses, No clubbing, cyanosis, or edema  PSYCH: AAOx3  NEUROLOGY: non-focal  SKIN: No rashes or lesions

## 2022-03-20 NOTE — ED CDU PROVIDER SUBSEQUENT DAY NOTE - NS ED ATTENDING STATEMENT MOD
This was a shared visit with the TRES. I reviewed and verified the documentation and independently performed the documented:

## 2022-03-20 NOTE — ED CDU PROVIDER SUBSEQUENT DAY NOTE - PROGRESS NOTE DETAILS
Patient resting comfortably awaiting MRI brain/ orbits. Patient unable to tolerate full MRI. Tech sent images to radiology. received signout from Iban Gamble pt completed mri; currently pending result for final dispo case d/w radiology resident - mri will not be dictated overnight; case d/w neuro np [place] - prelim mr head neg(by Dr. Rubalcava); mr orbits not completed - pt could not tolerate test; pt still not feeling better; will admit to hospital for futher testing;

## 2022-03-20 NOTE — ED CDU PROVIDER SUBSEQUENT DAY NOTE - HISTORY
pt resting in obs pending mr brain and orbits w/wo contrast; pt with chronic eye pain - initial given toradol with improvement; percocet ordered;

## 2022-03-21 ENCOUNTER — TRANSCRIPTION ENCOUNTER (OUTPATIENT)
Age: 52
End: 2022-03-21

## 2022-03-21 VITALS
TEMPERATURE: 98 F | DIASTOLIC BLOOD PRESSURE: 58 MMHG | RESPIRATION RATE: 18 BRPM | OXYGEN SATURATION: 98 % | SYSTOLIC BLOOD PRESSURE: 102 MMHG | HEART RATE: 63 BPM

## 2022-03-21 LAB
A1C WITH ESTIMATED AVERAGE GLUCOSE RESULT: 5.4 % — SIGNIFICANT CHANGE UP (ref 4–5.6)
BASOPHILS # BLD AUTO: 0.05 K/UL — SIGNIFICANT CHANGE UP (ref 0–0.2)
BASOPHILS NFR BLD AUTO: 0.8 % — SIGNIFICANT CHANGE UP (ref 0–1)
CHOLEST SERPL-MCNC: 227 MG/DL — HIGH
EOSINOPHIL # BLD AUTO: 0.36 K/UL — SIGNIFICANT CHANGE UP (ref 0–0.7)
EOSINOPHIL NFR BLD AUTO: 5.5 % — SIGNIFICANT CHANGE UP (ref 0–8)
ESTIMATED AVERAGE GLUCOSE: 108 MG/DL — SIGNIFICANT CHANGE UP (ref 68–114)
HCT VFR BLD CALC: 42 % — SIGNIFICANT CHANGE UP (ref 37–47)
HDLC SERPL-MCNC: 53 MG/DL — SIGNIFICANT CHANGE UP
HGB BLD-MCNC: 13.6 G/DL — SIGNIFICANT CHANGE UP (ref 12–16)
IMM GRANULOCYTES NFR BLD AUTO: 0.2 % — SIGNIFICANT CHANGE UP (ref 0.1–0.3)
LIPID PNL WITH DIRECT LDL SERPL: 156 MG/DL — HIGH
LYMPHOCYTES # BLD AUTO: 1.98 K/UL — SIGNIFICANT CHANGE UP (ref 1.2–3.4)
LYMPHOCYTES # BLD AUTO: 30.3 % — SIGNIFICANT CHANGE UP (ref 20.5–51.1)
MCHC RBC-ENTMCNC: 26.3 PG — LOW (ref 27–31)
MCHC RBC-ENTMCNC: 32.4 G/DL — SIGNIFICANT CHANGE UP (ref 32–37)
MCV RBC AUTO: 81.1 FL — SIGNIFICANT CHANGE UP (ref 81–99)
MONOCYTES # BLD AUTO: 0.4 K/UL — SIGNIFICANT CHANGE UP (ref 0.1–0.6)
MONOCYTES NFR BLD AUTO: 6.1 % — SIGNIFICANT CHANGE UP (ref 1.7–9.3)
NEUTROPHILS # BLD AUTO: 3.73 K/UL — SIGNIFICANT CHANGE UP (ref 1.4–6.5)
NEUTROPHILS NFR BLD AUTO: 57.1 % — SIGNIFICANT CHANGE UP (ref 42.2–75.2)
NON HDL CHOLESTEROL: 174 MG/DL — HIGH
NRBC # BLD: 0 /100 WBCS — SIGNIFICANT CHANGE UP (ref 0–0)
PLATELET # BLD AUTO: 299 K/UL — SIGNIFICANT CHANGE UP (ref 130–400)
RBC # BLD: 5.18 M/UL — SIGNIFICANT CHANGE UP (ref 4.2–5.4)
RBC # FLD: 13.4 % — SIGNIFICANT CHANGE UP (ref 11.5–14.5)
TRIGL SERPL-MCNC: 97 MG/DL — SIGNIFICANT CHANGE UP
WBC # BLD: 6.53 K/UL — SIGNIFICANT CHANGE UP (ref 4.8–10.8)
WBC # FLD AUTO: 6.53 K/UL — SIGNIFICANT CHANGE UP (ref 4.8–10.8)

## 2022-03-21 PROCEDURE — 99233 SBSQ HOSP IP/OBS HIGH 50: CPT

## 2022-03-21 PROCEDURE — 99239 HOSP IP/OBS DSCHRG MGMT >30: CPT

## 2022-03-21 RX ORDER — DIVALPROEX SODIUM 500 MG/1
1 TABLET, DELAYED RELEASE ORAL
Qty: 15 | Refills: 0
Start: 2022-03-21 | End: 2022-03-25

## 2022-03-21 RX ORDER — MAGNESIUM SULFATE 500 MG/ML
2 VIAL (ML) INJECTION ONCE
Refills: 0 | Status: COMPLETED | OUTPATIENT
Start: 2022-03-21 | End: 2022-03-21

## 2022-03-21 RX ORDER — DIVALPROEX SODIUM 500 MG/1
1 TABLET, DELAYED RELEASE ORAL
Qty: 21 | Refills: 0
Start: 2022-03-21 | End: 2022-03-27

## 2022-03-21 RX ORDER — DIVALPROEX SODIUM 500 MG/1
500 TABLET, DELAYED RELEASE ORAL THREE TIMES A DAY
Refills: 0 | Status: DISCONTINUED | OUTPATIENT
Start: 2022-03-21 | End: 2022-03-21

## 2022-03-21 RX ADMIN — Medication 25 GRAM(S): at 02:01

## 2022-03-21 RX ADMIN — DIVALPROEX SODIUM 500 MILLIGRAM(S): 500 TABLET, DELAYED RELEASE ORAL at 05:02

## 2022-03-21 RX ADMIN — DIVALPROEX SODIUM 500 MILLIGRAM(S): 500 TABLET, DELAYED RELEASE ORAL at 14:48

## 2022-03-21 NOTE — DISCHARGE NOTE PROVIDER - CARE PROVIDERS DIRECT ADDRESSES
[email or web address]

## 2022-03-21 NOTE — CONSULT NOTE ADULT - SUBJECTIVE AND OBJECTIVE BOX
Neurology consult    TYRON [id number]    HPI:  52 year old F lady with PMH of psoriasis, elevated GUERLINE, following with rheumatology recently, presented for intermitted bilateral eye pain.  Symptoms has been going on for almost a year, worsening over the last 2 months ago. Patient follows with ophthalmologist Dr. Belcher at [facility], started on Flarex eye drops 0.1 %. This morning, patient's pain was very severe, woke her up from sleep 20/10 in intensity associated with nausea headache, and diplopia. She also had bilateral numbness over cheeks and the tip of her  nose.  When seen in ED, pain is better, 8/10 in intensity, associated with photophobia. Patient also mentioned she feels her vision was blurry, and her ophthalmologist attribute that to Flarex which she was tapering over the last 2 months. her headache was getting better.  She denies weakness, tingling, numbness, gait changes, bowel or bladder symptoms.    in the ED,pt's VS T(C): 36.1 (22 @ 16:45), Max: 36.5 (22 @ 00:20)  HR: 65 (22 @ 16:45) (61 - 71)  BP: 104/64 (22 @ 16:45) (98/55 - 111/71)  RR: 18 (22 @ 16:45) (17 - 18)  SpO2: 99% (22 @ 16:45) (99% - 100%), labs done were unremarkable  from: MR Orbit, Face, and/or Neck w/o Cont Disc (22 @ 16:18) >  Incomplete examination due to patient inability to tolerate full exam.    No evidence of acute intracranial pathology or gross abnormal signal   involving the optic nerves though evaluation of the orbits is suboptimal   without intravenous contrast.    < from: CT Angio Head w/ IV Cont (22 @ 12:39) >  CT HEAD:  No acute intracranial pathology. No evidence of midline shift, mass   effector intracranial hemorrhage.  CTA HEAD:  No evidence of flow-limiting stenosis, occlusion or aneurysm.  CTA NECK:  No evidence of carotid or vertebral artery stenosis.  pt received lorazepam, oxycodone, ketorolac, 1 liter LR.   pt is admitted for workup/management (20 Mar 2022 22:03)      Interval Hx: Pt reports pain is better controlled, now 7/10 rather than 20/10 on presentation however pain is now constant rather than intermittent, feels as if she got punched in right eye.       MEDICATIONS    acetaminophen     Tablet .. 650 milliGRAM(s) Oral every 6 hours PRN  aluminum hydroxide/magnesium hydroxide/simethicone Suspension 30 milliLiter(s) Oral every 4 hours PRN  diVALproex  milliGRAM(s) Oral three times a day  lactated ringers. 1000 milliLiter(s) IV Continuous <Continuous>  melatonin 3 milliGRAM(s) Oral at bedtime PRN  ondansetron Injectable 4 milliGRAM(s) IV Push every 8 hours PRN         Family history: No history of dementia, strokes, or seizures   FAMILY HISTORY:  FH: CAD (coronary artery disease) (Father, Mother)      SOCIAL HISTORY -- No history of tobacco or alcohol use     Allergies    penicillin (Unknown)    Intolerances            Vital Signs Last 24 Hrs  T(C): 36.6 (21 Mar 2022 08:53), Max: 36.6 (21 Mar 2022 08:53)  T(F): 97.8 (21 Mar 2022 08:53), Max: 97.8 (21 Mar 2022 08:53)  HR: 65 (21 Mar 2022 08:53) (64 - 71)  BP: 104/55 (21 Mar 2022 08:53) (100/62 - 105/71)  BP(mean): --  RR: 18 (21 Mar 2022 08:53) (17 - 18)  SpO2: 98% (21 Mar 2022 08:53) (98% - 99%)      REVIEW OF SYSTEMS:    Constitutional: No fever, chills, fatigue, weakness  Eyes: +7/10 right eye pain, no diplopia or blurry vision   ENT:  No difficulty hearing, tinnitus, vertigo; No sinus or throat pain  Neck: No pain or stiffness  Respiratory: No cough, dyspnea, wheezing   Cardiovascular: No chest pain, palpitations,   Gastrointestinal: No abdominal or epigastric pain. No nausea, vomiting  No diarrhea or constipation.   Genitourinary: No dysuria, frequency, hematuria or incontinence  Neurological: +R eye pain, no headaches, lightheadedness, vertigo, numbness or tremors  Psychiatric: No depression, anxiety, mood swings or difficulty sleeping  Musculoskeletal: No joint pain or swelling; No muscle, back or extremity pain  Skin: No itching, burning, rashes or lesions   Lymph Nodes: No enlarged glands  Endocrine: No heat or cold intolerance; No hair loss, No h/o diabetes or thyroid dysfunction  Allergy and Immunologic: No hives or eczema        On Neurological Examination:    Mental Status - Patient is alert, awake, oriented X3.     Follows commands well and able to answer questions appropriately.    Speech -   Fluent                         Cranial Nerves - Pupils 3 mm equal and reactive to light,   extraocular eye movements intact. Previously noted APD not appreciated.     Motor Exam -   Right upper 5/5  Left upper 5/5  Right lower 5/5  Left lower 5/5    With stimuli positive movement of all 4 extremities    Muscle tone - is normal all over. No asymmetry is seen.      Sensory- Bilateral intact to light touch    GENERAL Exam:     Nontoxic , No Acute Distress   	  HEENT:  normocephalic, atraumatic  		  LUNGS:	Clear bilaterally  No Wheeze   	  HEART:	 Normal S1S2   No murmur       	  GI/ ABDOMEN:  Soft  Non tender    EXTREMITIES:   No Edema  No Clubbing  No Cyanosis No Edema    MUSCULOSKELETAL: Normal Range of Motion  	   SKIN:   Normal   No Ecchymosis       LABS:  CBC Full  -  ( 21 Mar 2022 07:10 )  WBC Count : 6.53 K/uL  RBC Count : 5.18 M/uL  Hemoglobin : 13.6 g/dL  Hematocrit : 42.0 %  Platelet Count - Automated : 299 K/uL  Mean Cell Volume : 81.1 fL  Mean Cell Hemoglobin : 26.3 pg  Mean Cell Hemoglobin Concentration : 32.4 g/dL  Auto Neutrophil # : 3.73 K/uL  Auto Lymphocyte # : 1.98 K/uL  Auto Monocyte # : 0.40 K/uL  Auto Eosinophil # : 0.36 K/uL  Auto Basophil # : 0.05 K/uL  Auto Neutrophil % : 57.1 %  Auto Lymphocyte % : 30.3 %  Auto Monocyte % : 6.1 %  Auto Eosinophil % : 5.5 %  Auto Basophil % : 0.8 %    Urinalysis Basic - ( 19 Mar 2022 13:00 )    Color: Light Yellow / Appearance: Clear / S.014 / pH: x  Gluc: x / Ketone: Negative  / Bili: Negative / Urobili: <2 mg/dL   Blood: x / Protein: Negative / Nitrite: Negative   Leuk Esterase: Negative / RBC: 2 /HPF / WBC 4 /HPF   Sq Epi: x / Non Sq Epi: 5 /HPF / Bacteria: Negative          141  |  104  |  14  ----------------------------<  97  4.0   |  24  |  0.7    Ca    9.6      19 Mar 2022 12:00    TPro  7.3  /  Alb  4.7  /  TBili  0.4  /  DBili  x   /  AST  15  /  ALT  14  /  AlkPhos  75      Hemoglobin A1C:   Lipid Panel  @ 07:10  Total Cholesterol, Serum 227  LDL --  Triglycerides 97    LIVER FUNCTIONS - ( 19 Mar 2022 12:00 )  Alb: 4.7 g/dL / Pro: 7.3 g/dL / ALK PHOS: 75 U/L / ALT: 14 U/L / AST: 15 U/L / GGT: x           Vitamin B12   PT/INR - ( 19 Mar 2022 12:00 )   PT: 11.70 sec;   INR: 1.02 ratio         PTT - ( 19 Mar 2022 12:00 )  PTT:32.0 sec      RADIOLOGY  < from: CT Head No Cont (22 @ 12:23) >  IMPRESSION:  CT HEAD:  No acute intracranial pathology. No evidence of midline shift, mass   effector intracranial hemorrhage.    CTA HEAD:  No evidence of flow-limiting stenosis, occlusion or aneurysm.    CTA NECK:  No evidence of carotid or vertebral artery stenosis.    < end of copied text >    < from: MR Head No Cont (22 @ 16:17) >  IMPRESSION:  Incomplete examination due to patient inability to tolerate full exam.    No evidence of acute intracranial pathology or gross abnormal signal   involving the optic nerves though evaluation of the orbits is suboptimal   without intravenous contrast.    < end of copied text >    EKG                     Neurology consult    TYRON LOPES 52y Female    HPI:  52 year old F lady with PMH of psoriasis, elevated GUERLINE, following with rheumatology recently, presented for intermitted bilateral eye pain.  Symptoms has been going on for almost a year, worsening over the last 2 months ago. Patient follows with ophthalmologist Dr. Belcher at [facility], started on Flarex eye drops 0.1 %. This morning, patient's pain was very severe, woke her up from sleep 20/10 in intensity associated with nausea headache, and diplopia. She also had bilateral numbness over cheeks and the tip of her  nose.  When seen in ED, pain is better, 8/10 in intensity, associated with photophobia. Patient also mentioned she feels her vision was blurry, and her ophthalmologist attribute that to Flarex which she was tapering over the last 2 months. her headache was getting better.  She denies weakness, tingling, numbness, gait changes, bowel or bladder symptoms.    in the ED,pt's VS T(C): 36.1 (22 @ 16:45), Max: 36.5 (22 @ 00:20)  HR: 65 (22 @ 16:45) (61 - 71)  BP: 104/64 (22 @ 16:45) (98/55 - 111/71)  RR: 18 (22 @ 16:45) (17 - 18)  SpO2: 99% (22 @ 16:45) (99% - 100%), labs done were unremarkable  from: MR Orbit, Face, and/or Neck w/o Cont Disc (22 @ 16:18) >  Incomplete examination due to patient inability to tolerate full exam.    No evidence of acute intracranial pathology or gross abnormal signal   involving the optic nerves though evaluation of the orbits is suboptimal   without intravenous contrast.    < from: CT Angio Head w/ IV Cont (22 @ 12:39) >  CT HEAD:  No acute intracranial pathology. No evidence of midline shift, mass   effector intracranial hemorrhage.  CTA HEAD:  No evidence of flow-limiting stenosis, occlusion or aneurysm.  CTA NECK:  No evidence of carotid or vertebral artery stenosis.  pt received lorazepam, oxycodone, ketorolac, 1 liter LR.   pt is admitted for workup/management (20 Mar 2022 22:03)      Interval Hx: Pt reports pain is better controlled, now 7/10 rather than 20/10 on presentation however pain is now constant rather than intermittent, feels as if she got punched in right eye.       MEDICATIONS    acetaminophen     Tablet .. 650 milliGRAM(s) Oral every 6 hours PRN  aluminum hydroxide/magnesium hydroxide/simethicone Suspension 30 milliLiter(s) Oral every 4 hours PRN  diVALproex  milliGRAM(s) Oral three times a day  lactated ringers. 1000 milliLiter(s) IV Continuous <Continuous>  melatonin 3 milliGRAM(s) Oral at bedtime PRN  ondansetron Injectable 4 milliGRAM(s) IV Push every 8 hours PRN         Family history: No history of dementia, strokes, or seizures   FAMILY HISTORY:  FH: CAD (coronary artery disease) (Father, Mother)      SOCIAL HISTORY -- No history of tobacco or alcohol use     Allergies    penicillin (Unknown)    Intolerances            Vital Signs Last 24 Hrs  T(C): 36.6 (21 Mar 2022 08:53), Max: 36.6 (21 Mar 2022 08:53)  T(F): 97.8 (21 Mar 2022 08:53), Max: 97.8 (21 Mar 2022 08:53)  HR: 65 (21 Mar 2022 08:53) (64 - 71)  BP: 104/55 (21 Mar 2022 08:53) (100/62 - 105/71)  BP(mean): --  RR: 18 (21 Mar 2022 08:53) (17 - 18)  SpO2: 98% (21 Mar 2022 08:53) (98% - 99%)      REVIEW OF SYSTEMS:    Constitutional: No fever, chills, fatigue, weakness  Eyes: +7/10 right eye pain, no diplopia or blurry vision   ENT:  No difficulty hearing, tinnitus, vertigo; No sinus or throat pain  Neck: No pain or stiffness  Respiratory: No cough, dyspnea, wheezing   Cardiovascular: No chest pain, palpitations,   Gastrointestinal: No abdominal or epigastric pain. No nausea, vomiting  No diarrhea or constipation.   Genitourinary: No dysuria, frequency, hematuria or incontinence  Neurological: +R eye pain, no headaches, lightheadedness, vertigo, numbness or tremors  Psychiatric: No depression, anxiety, mood swings or difficulty sleeping  Musculoskeletal: No joint pain or swelling; No muscle, back or extremity pain  Skin: No itching, burning, rashes or lesions   Lymph Nodes: No enlarged glands  Endocrine: No heat or cold intolerance; No hair loss, No h/o diabetes or thyroid dysfunction  Allergy and Immunologic: No hives or eczema        On Neurological Examination:    Mental Status - Patient is alert, awake, oriented X3.     Follows commands well and able to answer questions appropriately.    Speech -   Fluent                         Cranial Nerves - Pupils 3 mm equal and reactive to light,   extraocular eye movements intact. Previously noted APD not appreciated.     Motor Exam -   Right upper 5/5  Left upper 5/5  Right lower 5/5  Left lower 5/5    With stimuli positive movement of all 4 extremities    Muscle tone - is normal all over. No asymmetry is seen.      Sensory- Bilateral intact to light touch    GENERAL Exam:     Nontoxic , No Acute Distress   	  HEENT:  normocephalic, atraumatic  		  LUNGS:	Clear bilaterally  No Wheeze   	  HEART:	 Normal S1S2   No murmur       	  GI/ ABDOMEN:  Soft  Non tender    EXTREMITIES:   No Edema  No Clubbing  No Cyanosis No Edema    MUSCULOSKELETAL: Normal Range of Motion  	   SKIN:   Normal   No Ecchymosis       LABS:  CBC Full  -  ( 21 Mar 2022 07:10 )  WBC Count : 6.53 K/uL  RBC Count : 5.18 M/uL  Hemoglobin : 13.6 g/dL  Hematocrit : 42.0 %  Platelet Count - Automated : 299 K/uL  Mean Cell Volume : 81.1 fL  Mean Cell Hemoglobin : 26.3 pg  Mean Cell Hemoglobin Concentration : 32.4 g/dL  Auto Neutrophil # : 3.73 K/uL  Auto Lymphocyte # : 1.98 K/uL  Auto Monocyte # : 0.40 K/uL  Auto Eosinophil # : 0.36 K/uL  Auto Basophil # : 0.05 K/uL  Auto Neutrophil % : 57.1 %  Auto Lymphocyte % : 30.3 %  Auto Monocyte % : 6.1 %  Auto Eosinophil % : 5.5 %  Auto Basophil % : 0.8 %    Urinalysis Basic - ( 19 Mar 2022 13:00 )    Color: Light Yellow / Appearance: Clear / S.014 / pH: x  Gluc: x / Ketone: Negative  / Bili: Negative / Urobili: <2 mg/dL   Blood: x / Protein: Negative / Nitrite: Negative   Leuk Esterase: Negative / RBC: 2 /HPF / WBC 4 /HPF   Sq Epi: x / Non Sq Epi: 5 /HPF / Bacteria: Negative          141  |  104  |  14  ----------------------------<  97  4.0   |  24  |  0.7    Ca    9.6      19 Mar 2022 12:00    TPro  7.3  /  Alb  4.7  /  TBili  0.4  /  DBili  x   /  AST  15  /  ALT  14  /  AlkPhos  75      Hemoglobin A1C:   Lipid Panel  @ 07:10  Total Cholesterol, Serum 227  LDL --  Triglycerides 97    LIVER FUNCTIONS - ( 19 Mar 2022 12:00 )  Alb: 4.7 g/dL / Pro: 7.3 g/dL / ALK PHOS: 75 U/L / ALT: 14 U/L / AST: 15 U/L / GGT: x           Vitamin B12   PT/INR - ( 19 Mar 2022 12:00 )   PT: 11.70 sec;   INR: 1.02 ratio         PTT - ( 19 Mar 2022 12:00 )  PTT:32.0 sec      RADIOLOGY  < from: CT Head No Cont (22 @ 12:23) >  IMPRESSION:  CT HEAD:  No acute intracranial pathology. No evidence of midline shift, mass   effector intracranial hemorrhage.    CTA HEAD:  No evidence of flow-limiting stenosis, occlusion or aneurysm.    CTA NECK:  No evidence of carotid or vertebral artery stenosis.    < end of copied text >    < from: MR Head No Cont (22 @ 16:17) >  IMPRESSION:  Incomplete examination due to patient inability to tolerate full exam.    No evidence of acute intracranial pathology or gross abnormal signal   involving the optic nerves though evaluation of the orbits is suboptimal   without intravenous contrast.    < end of copied text >    EKG                     Neurology consult    TYRON LOPES 52y Female    HPI:  52 year old F lady with PMH of psoriasis, elevated GUERLINE, following with rheumatology recently, presented for intermitted bilateral eye pain.  Symptoms has been going on for almost a year, worsening over the last 2 months ago. Patient follows with ophthalmologist Dr. Belcher at [facility], started on Flarex eye drops 0.1 %. This morning, patient's pain was very severe, woke her up from sleep 20/10 in intensity associated with nausea headache, and diplopia. She also had bilateral numbness over cheeks and the tip of her  nose.  When seen in ED, pain is better, 8/10 in intensity, associated with photophobia. Patient also mentioned she feels her vision was blurry, and her ophthalmologist attribute that to Flarex which she was tapering over the last 2 months. her headache was getting better.  She denies weakness, tingling, numbness, gait changes, bowel or bladder symptoms.    in the ED,pt's VS T(C): 36.1 (22 @ 16:45), Max: 36.5 (22 @ 00:20)  HR: 65 (22 @ 16:45) (61 - 71)  BP: 104/64 (22 @ 16:45) (98/55 - 111/71)  RR: 18 (22 @ 16:45) (17 - 18)  SpO2: 99% (22 @ 16:45) (99% - 100%), labs done were unremarkable  from: MR Orbit, Face, and/or Neck w/o Cont Disc (22 @ 16:18) >  Incomplete examination due to patient inability to tolerate full exam.    No evidence of acute intracranial pathology or gross abnormal signal   involving the optic nerves though evaluation of the orbits is suboptimal   without intravenous contrast.    < from: CT Angio Head w/ IV Cont (22 @ 12:39) >  CT HEAD:  No acute intracranial pathology. No evidence of midline shift, mass   effector intracranial hemorrhage.  CTA HEAD:  No evidence of flow-limiting stenosis, occlusion or aneurysm.  CTA NECK:  No evidence of carotid or vertebral artery stenosis.  pt received lorazepam, oxycodone, ketorolac, 1 liter LR.   pt is admitted for workup/management (20 Mar 2022 22:03)      Interval Hx: Pt reports pain is better controlled, now 7/10 rather than 20/10 on presentation however pain is now constant rather than intermittent, feels as if she got punched in right eye.       MEDICATIONS    acetaminophen     Tablet .. 650 milliGRAM(s) Oral every 6 hours PRN  aluminum hydroxide/magnesium hydroxide/simethicone Suspension 30 milliLiter(s) Oral every 4 hours PRN  diVALproex  milliGRAM(s) Oral three times a day  lactated ringers. 1000 milliLiter(s) IV Continuous <Continuous>  melatonin 3 milliGRAM(s) Oral at bedtime PRN  ondansetron Injectable 4 milliGRAM(s) IV Push every 8 hours PRN         Family history: No history of dementia, strokes, or seizures   FAMILY HISTORY:  FH: CAD (coronary artery disease) (Father, Mother)      SOCIAL HISTORY -- No history of tobacco or alcohol use     Allergies    penicillin (Unknown)    Intolerances            Vital Signs Last 24 Hrs  T(C): 36.6 (21 Mar 2022 08:53), Max: 36.6 (21 Mar 2022 08:53)  T(F): 97.8 (21 Mar 2022 08:53), Max: 97.8 (21 Mar 2022 08:53)  HR: 65 (21 Mar 2022 08:53) (64 - 71)  BP: 104/55 (21 Mar 2022 08:53) (100/62 - 105/71)  BP(mean): --  RR: 18 (21 Mar 2022 08:53) (17 - 18)  SpO2: 98% (21 Mar 2022 08:53) (98% - 99%)      REVIEW OF SYSTEMS:    Constitutional: No fever, chills, fatigue, weakness  Eyes: +7/10 right eye pain, no diplopia or blurry vision   ENT:  No difficulty hearing, tinnitus, vertigo; No sinus or throat pain  Neck: No pain or stiffness  Respiratory: No cough, dyspnea, wheezing   Cardiovascular: No chest pain, palpitations,   Gastrointestinal: No abdominal or epigastric pain. No nausea, vomiting  No diarrhea or constipation.   Genitourinary: No dysuria, frequency, hematuria or incontinence  Neurological: +R eye pain, no headaches, lightheadedness, vertigo, numbness or tremors  Psychiatric: No depression, anxiety, mood swings or difficulty sleeping  Musculoskeletal: No joint pain or swelling; No muscle, back or extremity pain  Skin: No itching, burning, rashes or lesions   Lymph Nodes: No enlarged glands  Endocrine: No heat or cold intolerance; No hair loss, No h/o diabetes or thyroid dysfunction  Allergy and Immunologic: No hives or eczema        On Neurological Examination:    Mental Status - Patient is alert, awake, oriented X3.     Follows commands well and able to answer questions appropriately.    Speech -   Fluent                         Cranial Nerves - Pupils 3 mm equal and reactive to light,   extraocular eye movements intact. Visual acuity intact, no reported diplopia. PERRLA, no ptosis or weakness of eyelid closure. Previously noted APD not appreciated. Facial sensation nl V1-V3, no facial asymmetry. Hearing grossly intact and equal. Palate elevates midline, tongue and uvula midline.     Motor Exam -   Right upper 5/5  Left upper 5/5  Right lower 5/5  Left lower 5/5    Muscle tone - is normal all over. No asymmetry is seen.      Sensory- Bilateral intact to light touch    GENERAL Exam:     Nontoxic , No Acute Distress   	  HEENT:  normocephalic, atraumatic  		  LUNGS:	Clear bilaterally  No Wheeze   	  HEART:	 Normal S1S2   No murmur       	  GI/ ABDOMEN:  Soft  Non tender    EXTREMITIES:   No Edema  No Clubbing  No Cyanosis No Edema    MUSCULOSKELETAL: Normal Range of Motion  	   SKIN:   Normal   No Ecchymosis       LABS:  CBC Full  -  ( 21 Mar 2022 07:10 )  WBC Count : 6.53 K/uL  RBC Count : 5.18 M/uL  Hemoglobin : 13.6 g/dL  Hematocrit : 42.0 %  Platelet Count - Automated : 299 K/uL  Mean Cell Volume : 81.1 fL  Mean Cell Hemoglobin : 26.3 pg  Mean Cell Hemoglobin Concentration : 32.4 g/dL  Auto Neutrophil # : 3.73 K/uL  Auto Lymphocyte # : 1.98 K/uL  Auto Monocyte # : 0.40 K/uL  Auto Eosinophil # : 0.36 K/uL  Auto Basophil # : 0.05 K/uL  Auto Neutrophil % : 57.1 %  Auto Lymphocyte % : 30.3 %  Auto Monocyte % : 6.1 %  Auto Eosinophil % : 5.5 %  Auto Basophil % : 0.8 %    Urinalysis Basic - ( 19 Mar 2022 13:00 )    Color: Light Yellow / Appearance: Clear / S.014 / pH: x  Gluc: x / Ketone: Negative  / Bili: Negative / Urobili: <2 mg/dL   Blood: x / Protein: Negative / Nitrite: Negative   Leuk Esterase: Negative / RBC: 2 /HPF / WBC 4 /HPF   Sq Epi: x / Non Sq Epi: 5 /HPF / Bacteria: Negative          141  |  104  |  14  ----------------------------<  97  4.0   |  24  |  0.7    Ca    9.6      19 Mar 2022 12:00    TPro  7.3  /  Alb  4.7  /  TBili  0.4  /  DBili  x   /  AST  15  /  ALT  14  /  AlkPhos  75      Hemoglobin A1C:   Lipid Panel  @ 07:10  Total Cholesterol, Serum 227  LDL --  Triglycerides 97    LIVER FUNCTIONS - ( 19 Mar 2022 12:00 )  Alb: 4.7 g/dL / Pro: 7.3 g/dL / ALK PHOS: 75 U/L / ALT: 14 U/L / AST: 15 U/L / GGT: x           Vitamin B12   PT/INR - ( 19 Mar 2022 12:00 )   PT: 11.70 sec;   INR: 1.02 ratio         PTT - ( 19 Mar 2022 12:00 )  PTT:32.0 sec      RADIOLOGY  < from: CT Head No Cont (22 @ 12:23) >  IMPRESSION:  CT HEAD:  No acute intracranial pathology. No evidence of midline shift, mass   effector intracranial hemorrhage.    CTA HEAD:  No evidence of flow-limiting stenosis, occlusion or aneurysm.    CTA NECK:  No evidence of carotid or vertebral artery stenosis.    < end of copied text >    < from: MR Head No Cont (22 @ 16:17) >  IMPRESSION:  Incomplete examination due to patient inability to tolerate full exam.    No evidence of acute intracranial pathology or gross abnormal signal   involving the optic nerves though evaluation of the orbits is suboptimal   without intravenous contrast.    < end of copied text >    EKG                     Neurology consult    TYRON LOPES 52y Female    HPI:  52 year old F lady with PMH of psoriasis, elevated GUERLINE, following with rheumatology recently, presented for intermitted bilateral eye pain.  Symptoms has been going on for almost a year, worsening over the last 2 months ago. Patient follows with optometrist Dr. Belcher at [facility], started on Flarex eye drops 0.1 %. This morning, patient's pain was very severe, woke her up from sleep 20/10 in intensity associated with nausea headache, and diplopia. She also had bilateral numbness over cheeks and the tip of her  nose.  When seen in ED, pain is better, 8/10 in intensity, associated with photophobia. Patient also mentioned she feels her vision was blurry, and her ophthalmologist attribute that to Flarex which she was tapering over the last 2 months. her headache was getting better.  She denies weakness, tingling, numbness, gait changes, bowel or bladder symptoms.    in the ED,pt's VS T(C): 36.1 (22 @ 16:45), Max: 36.5 (22 @ 00:20)  HR: 65 (22 @ 16:45) (61 - 71)  BP: 104/64 (22 @ 16:45) (98/55 - 111/71)  RR: 18 (22 @ 16:45) (17 - 18)  SpO2: 99% (22 @ 16:45) (99% - 100%), labs done were unremarkable  from: MR Orbit, Face, and/or Neck w/o Cont Disc (22 @ 16:18) >  Incomplete examination due to patient inability to tolerate full exam.    No evidence of acute intracranial pathology or gross abnormal signal   involving the optic nerves though evaluation of the orbits is suboptimal   without intravenous contrast.    < from: CT Angio Head w/ IV Cont (22 @ 12:39) >  CT HEAD:  No acute intracranial pathology. No evidence of midline shift, mass   effector intracranial hemorrhage.  CTA HEAD:  No evidence of flow-limiting stenosis, occlusion or aneurysm.  CTA NECK:  No evidence of carotid or vertebral artery stenosis.  pt received lorazepam, oxycodone, ketorolac, 1 liter LR.   pt is admitted for workup/management (20 Mar 2022 22:03)      Interval Hx: Pt reports pain is better controlled, now 7/10 rather than 20/10 on presentation however pain is now constant rather than intermittent, feels as if she got punched in right eye.       MEDICATIONS    acetaminophen     Tablet .. 650 milliGRAM(s) Oral every 6 hours PRN  aluminum hydroxide/magnesium hydroxide/simethicone Suspension 30 milliLiter(s) Oral every 4 hours PRN  diVALproex  milliGRAM(s) Oral three times a day  lactated ringers. 1000 milliLiter(s) IV Continuous <Continuous>  melatonin 3 milliGRAM(s) Oral at bedtime PRN  ondansetron Injectable 4 milliGRAM(s) IV Push every 8 hours PRN         Family history: No history of dementia, strokes, or seizures   FAMILY HISTORY:  FH: CAD (coronary artery disease) (Father, Mother)      SOCIAL HISTORY -- No history of tobacco or alcohol use     Allergies    penicillin (Unknown)    Intolerances            Vital Signs Last 24 Hrs  T(C): 36.6 (21 Mar 2022 08:53), Max: 36.6 (21 Mar 2022 08:53)  T(F): 97.8 (21 Mar 2022 08:53), Max: 97.8 (21 Mar 2022 08:53)  HR: 65 (21 Mar 2022 08:53) (64 - 71)  BP: 104/55 (21 Mar 2022 08:53) (100/62 - 105/71)  BP(mean): --  RR: 18 (21 Mar 2022 08:53) (17 - 18)  SpO2: 98% (21 Mar 2022 08:53) (98% - 99%)      REVIEW OF SYSTEMS:    Constitutional: No fever, chills, fatigue, weakness  Eyes: +7/10 right eye pain, no diplopia or blurry vision   ENT:  No difficulty hearing, tinnitus, vertigo; No sinus or throat pain  Neck: No pain or stiffness  Respiratory: No cough, dyspnea, wheezing   Cardiovascular: No chest pain, palpitations,   Gastrointestinal: No abdominal or epigastric pain. No nausea, vomiting  No diarrhea or constipation.   Genitourinary: No dysuria, frequency, hematuria or incontinence  Neurological: +R eye pain, no headaches, lightheadedness, vertigo, numbness or tremors  Psychiatric: No depression, anxiety, mood swings or difficulty sleeping  Musculoskeletal: No joint pain or swelling; No muscle, back or extremity pain  Skin: No itching, burning, rashes or lesions   Lymph Nodes: No enlarged glands  Endocrine: No heat or cold intolerance; No hair loss, No h/o diabetes or thyroid dysfunction  Allergy and Immunologic: No hives or eczema        On Neurological Examination:    Mental Status - Patient is alert, awake, oriented X3.     Follows commands well and able to answer questions appropriately.    Speech -   Fluent                         Cranial Nerves - Pupils 3 mm equal and reactive to light,   extraocular eye movements intact. Visual acuity intact, no reported diplopia. PERRLA, no ptosis or weakness of eyelid closure. Previously noted APD not appreciated. Facial sensation nl V1-V3, no facial asymmetry. Hearing grossly intact and equal. Palate elevates midline, tongue and uvula midline.     Motor Exam -   Right upper 5/5  Left upper 5/5  Right lower 5/5  Left lower 5/5    Muscle tone - is normal all over. No asymmetry is seen.      Sensory- Bilateral intact to light touch    GENERAL Exam:     Nontoxic , No Acute Distress   	  HEENT:  normocephalic, atraumatic  		  LUNGS:	Clear bilaterally  No Wheeze   	  HEART:	 Normal S1S2   No murmur       	  GI/ ABDOMEN:  Soft  Non tender    EXTREMITIES:   No Edema  No Clubbing  No Cyanosis No Edema    MUSCULOSKELETAL: Normal Range of Motion  	   SKIN:   Normal   No Ecchymosis       LABS:  CBC Full  -  ( 21 Mar 2022 07:10 )  WBC Count : 6.53 K/uL  RBC Count : 5.18 M/uL  Hemoglobin : 13.6 g/dL  Hematocrit : 42.0 %  Platelet Count - Automated : 299 K/uL  Mean Cell Volume : 81.1 fL  Mean Cell Hemoglobin : 26.3 pg  Mean Cell Hemoglobin Concentration : 32.4 g/dL  Auto Neutrophil # : 3.73 K/uL  Auto Lymphocyte # : 1.98 K/uL  Auto Monocyte # : 0.40 K/uL  Auto Eosinophil # : 0.36 K/uL  Auto Basophil # : 0.05 K/uL  Auto Neutrophil % : 57.1 %  Auto Lymphocyte % : 30.3 %  Auto Monocyte % : 6.1 %  Auto Eosinophil % : 5.5 %  Auto Basophil % : 0.8 %    Urinalysis Basic - ( 19 Mar 2022 13:00 )    Color: Light Yellow / Appearance: Clear / S.014 / pH: x  Gluc: x / Ketone: Negative  / Bili: Negative / Urobili: <2 mg/dL   Blood: x / Protein: Negative / Nitrite: Negative   Leuk Esterase: Negative / RBC: 2 /HPF / WBC 4 /HPF   Sq Epi: x / Non Sq Epi: 5 /HPF / Bacteria: Negative          141  |  104  |  14  ----------------------------<  97  4.0   |  24  |  0.7    Ca    9.6      19 Mar 2022 12:00    TPro  7.3  /  Alb  4.7  /  TBili  0.4  /  DBili  x   /  AST  15  /  ALT  14  /  AlkPhos  75      Hemoglobin A1C:   Lipid Panel  @ 07:10  Total Cholesterol, Serum 227  LDL --  Triglycerides 97    LIVER FUNCTIONS - ( 19 Mar 2022 12:00 )  Alb: 4.7 g/dL / Pro: 7.3 g/dL / ALK PHOS: 75 U/L / ALT: 14 U/L / AST: 15 U/L / GGT: x           Vitamin B12   PT/INR - ( 19 Mar 2022 12:00 )   PT: 11.70 sec;   INR: 1.02 ratio         PTT - ( 19 Mar 2022 12:00 )  PTT:32.0 sec      RADIOLOGY  < from: CT Head No Cont (22 @ 12:23) >  IMPRESSION:  CT HEAD:  No acute intracranial pathology. No evidence of midline shift, mass   effector intracranial hemorrhage.    CTA HEAD:  No evidence of flow-limiting stenosis, occlusion or aneurysm.    CTA NECK:  No evidence of carotid or vertebral artery stenosis.    < end of copied text >    < from: MR Head No Cont (22 @ 16:17) >  IMPRESSION:  Incomplete examination due to patient inability to tolerate full exam.    No evidence of acute intracranial pathology or gross abnormal signal   involving the optic nerves though evaluation of the orbits is suboptimal   without intravenous contrast.    < end of copied text >    EKG

## 2022-03-21 NOTE — ED ADULT NURSE REASSESSMENT NOTE - NS ED NURSE REASSESS COMMENT FT1
Spoke with RN (Leidy) at MRI. RN made aware patient will need aesthesia for MRI and patient is NPO since yesterday at 1800

## 2022-03-21 NOTE — PROGRESS NOTE ADULT - SUBJECTIVE AND OBJECTIVE BOX
Patient Information:  TYRON LOPES / 52y / Female / MRN#:[id number]    Hospital Day: 1d    Interval History:  Patient seen and examined at bedside. No acute events overnight. Pt reports some improvement of her sx, although continues to have bilateral eye pain and blurry vision.    Past Medical History:  Psoriasis      Past Surgical History:  Known health problems: none      Allergies:  penicillin (Unknown)    Medications:  PRN:  acetaminophen     Tablet .. 650 milliGRAM(s) Oral every 6 hours PRN Temp greater or equal to 38C (100.4F), Mild Pain (1 - 3)  aluminum hydroxide/magnesium hydroxide/simethicone Suspension 30 milliLiter(s) Oral every 4 hours PRN Dyspepsia  melatonin 3 milliGRAM(s) Oral at bedtime PRN Insomnia  ondansetron Injectable 4 milliGRAM(s) IV Push every 8 hours PRN Nausea and/or Vomiting    Standing:  diVALproex  milliGRAM(s) Oral three times a day  lactated ringers. 1000 milliLiter(s) (500 mL/Hr) IV Continuous <Continuous>    Home:    Vitals:  T(C): 36.6, Max: 36.6 (22 @ 08:53)  T(F): 97.8, Max: 97.8 (22 @ 08:53)  HR: 65 (64 - 71)  BP: 104/55 (100/62 - 105/71)  RR: 18 (17 - 18)  SpO2: 98% (98% - 99%)    Physical Exam:  General: Awake, alert, NAD, resting comfortably in bed, on RA  HEENT: Head NC/AT, PERRLA  Heart: RRR; S1/S2; no rubs, murmurs appreciated  Lungs: Clear to auscultation bilaterally, no wheezing, rales or rhonchi  Abdomen: Soft, nontender, nondistended, BS+  Extremities: No edema, clubbing, cyanosis in upper or lower extremities.  Neuro: AAOx3, normal motor strength in bilateral upper and lower extremities, sensation grossly intact    Labs:  CBC ( @ 07:10)                        Hgb: 13.6   WBC: 6.53  )-----------------( Plts: 299                              Hct: 42.0     Chem ( @ 12:00)  Na: 141  |     Cl: 104     |  BUN: 14  -----------------------------------------< Gluc: 97    K: 4.0   |    HCO3: 24  |  Cr: 0.7    Ca 9.6 ( @ 12:00)    LFTs ( @ 12:00)  TPro 7.3  /  Alb 4.7  TBili 0.4  /  DBili     AST 15  /  ALT 14  /  AlkPhos 75        PT/INR ( @ 12:00)  PT: 11.70; INR: 1.02   PTT: 32.0           Urinalysis Basic ( @ 13:00)  Color: Light Yellow  Appearance: Clear  S.014  pH:   Gluc:   Ketone: Negative  Bili: Negative  Urobili: <2 mg/dL   Blood:   Protein: Negative  Nitrite: Negative   Leuk Esterase: Negative  RBC: 2  WBC: 4   Sq Epi:   Non Sq Epi: 5  Bacteria: Negative    Microbiology:    Radiology:

## 2022-03-21 NOTE — DISCHARGE NOTE NURSING/CASE MANAGEMENT/SOCIAL WORK - PATIENT PORTAL LINK FT
You can access the FollowMyHealth Patient Portal offered by [facility] by registering at the following website: [email or web address]. By joining [employer]’s FollowMyHealth portal, you will also be able to view your health information using other applications (apps) compatible with our system.

## 2022-03-21 NOTE — PROGRESS NOTE ADULT - ATTENDING COMMENTS
52 year old F lady with PMH of psoriasis, elevated GUERLINE, following with rheumatology recently, presented for intermittent  bilateral eye pain.  Symptoms has been going on for almost a year, worsening over the last 2 months ago. Patient follows with ophthalmologist Dr. Belcher recently started on Flarex eye drops 0.1 %. At time of admission, patient's pain was very severe, woke her up from sleep 20/10 in intensity associated with nausea headache, and diplopia. She also had bilateral numbness over cheeks and the tip of her  nose. In the ED pain had improved to 8/10 in intensity, associated with photophobia, blurry vision. At this time pain continues to improve, blurry vision is persistent.     Since presentation pt has had a CTH and CTA which were unremarkable. Pt had an MRI Orbit/Face/Neck w/o contrast without significant findings, but limited by severe claustrophobia/anxiety. Pt will need and MRI Brain/ Orbits with IV Contrast to be done under anesthesia. Neuro to admit to their service. Medicine covering while pt is in the ED.     General: Awake, alert, NAD, resting comfortably in bed, on RA  HEENT: Head NC/AT, PERRLA  Heart: RRR; S1/S2; no rubs, murmurs appreciated  Lungs: Clear to auscultation bilaterally, no wheezing, rales or rhonchi  Abdomen: Soft, nontender, nondistended, BS+  Extremities: No edema, clubbing, cyanosis in upper or lower extremities.  Neuro: AAOx3, normal motor strength in bilateral upper and lower extremities, sensation grossly intact    A/P     #B/l intermittent eye pain  #R/o optic neuritis  - labs done were unremarkable   - MR Orbit, Face, and/or Neck w/o Cont Disc (03.20.22 @ 16:18) >  Incomplete examination due to patient inability to tolerate full exam.  No evidence of acute intracranial pathology or gross abnormal signal   involving the optic nerves though evaluation of the orbits is suboptimal   without intravenous contrast.  - CTH, CTA Head and CTA Neck- no acute pathology   -MRI brain w/wo contrast  and MR Orbit w/wo contrast ordered and pending - needs anesthesia   - Neuro following:   Start Depakote 500mg IV q 8 x 3 doses  Magnesium 2gram x1  Hold Lovenox in am for LP  Admit to Neuro service   - Optho consulted and pending      #Progress Note Handoff:  Pending (specify):  MRI w/ anesthesia, admit to neuro service   Family discussion: d/w pt   Disposition: Home___/SNF___/Other________/Unknown at this time___x_____        Gregoria Delgadillo DO

## 2022-03-21 NOTE — CONSULT NOTE ADULT - ATTENDING COMMENTS
Patient examined 3/19/22 for bilateral eye pain, photophobia, headache.   Agree with MRI brain and orbits w/wo contrast.  May need CSF studies.
Pt w/ longstanding b/l eye irritation started on steroid gtts x 2 months with increasing binocular blurred vision and pain suspect medication side effect with normal neurologic exam and workup.  possible rheumatologic disease with h/o psoriasis and elevated GUERLINE.  No need for LP at this time.  No further inpt neurologic w/u and f/u with rheumatology and ophthalmology as outpt.  May f/u in neurology clinic if symptoms persist.

## 2022-03-21 NOTE — DISCHARGE NOTE PROVIDER - NSDCCPCAREPLAN_GEN_ALL_CORE_FT
PRINCIPAL DISCHARGE DIAGNOSIS  Diagnosis: Pain in eye, bilateral  Assessment and Plan of Treatment: You came into the hospital with pain in both of your eyes. You were admitted to the hospital out of concern for optic neuritis, a condition where there is inflamation in the nerve that supplies your eye however your symptoms are more consistent with glaucoma (high pressure in the eyes) or a headache syndrome rather than optic neuritis. We did a CT scan of your head and neck which was normal. We also did a MRI which you were unable to complete the whole exam for however you completed enough of the exam for us to see that you do not have any massess or strokes and that it is very unlikely you have optic neuritis. We started you on depakote, a medicaition that can help with headache pain and your pain improved. We are prescribing you a few more days worth to see if it helps with the pain further. Please follow up with Dr. Rubalcava (neurology) within 2 weeks of discharge to see how your symptoms are.   You also saw a optometrist however you need to see a ophthalmologist. Please stop taking the eye drops you were prescribed until you can see a opthalmologist to assess if this is needed. Please continue to follow with your rheumatologist and primary doctor as well.   If you develop worsening eye pain or loss of vision please seek evalutation by a medical professional as soon as possible.

## 2022-03-21 NOTE — CHART NOTE - NSCHARTNOTEFT_GEN_A_CORE
MRI brain results negative, MR orbits suboptimal exam due to no contrast. Patient continues to report symptoms and thus at this point will need admission to r/o other causes. Discussed with neurology attending on call Dr Khadar werner.    Plan  -Start Depakote 500mg IV q 8 x 3 doses  -Magnesium 2gram x1  -Hold Lovenox in am for LP  -MRI orbits with contrast  -Admit to Neurology service

## 2022-03-21 NOTE — DISCHARGE NOTE PROVIDER - PROVIDER TOKENS
PROVIDER:[TOKEN:[[id number]],FOLLOWUP:[1 week]],PROVIDER:[TOKEN:[[id number]],FOLLOWUP:[1 week]],PROVIDER:[TOKEN:[[id number]],FOLLOWUP:[2 weeks]]

## 2022-03-21 NOTE — DISCHARGE NOTE PROVIDER - ATTENDING DISCHARGE PHYSICAL EXAMINATION:
NAD.AOx3. lang intact  CN 2- 12 intact.  No APD. VFF. no hyperemia or conjunctival injection  MS 5/5  sensory intact  slight TTP b/l orbits and temples but no pain with movement, hyperemia, injection or induration.

## 2022-03-21 NOTE — CONSULT NOTE ADULT - ASSESSMENT
52 year old F lady with PMH of psoriasis, elevated GUERLINE, following with rheumatology recently, presented for intermitted bilateral eye pain, worsening over the last 2 months ago, associated with nausea headache, and diplopia.     #BL intermittent eye pain   #R/o Optic neuritis   >Pt with h/o intermittent BL eye pain over last 2 months, following with optho   >Pt presented with 20/10 eye pain, nausea, HA, diplopia, BL numbness over cheeks and tip of nose   >On exam today, previously noted facial numbness resolved, did not appreciate previously noted APD   >Pt unable to tolerate MR, recommend rpt MR under anesthesia to r/o optic neuritis     - MR brain w/wo contrast   - MR orbit w/wo contrast   - Depakote 500mg IV q8h x3 doses (pt received one dose so far)  - Continue holding lvnx for possible LP     **This is a pended note, please await attending attestation for final recs**   52 year old F lady with PMH of psoriasis, elevated GUERLINE, following with rheumatology recently, presented for intermitted bilateral eye pain, worsening over the last 2 months ago, associated with nausea headache, and diplopia.     #BL intermittent eye pain 2/2 glaucoma vs headache syndrome   >Pt with h/o intermittent BL eye pain over last 2 months, following with optho   >Pt presented with 20/10 eye pain, nausea, HA, diplopia, BL numbness over cheeks and tip of nose   >On exam today, previously noted facial numbness resolved, did not appreciate previously noted APD   >Pt unable to complete contrast portion of MR however noncontrast MR showing no evidence of mass, stroke, or optic neuritis   >Pt seen by Dr. Belcher at [facility] who is a optometrist however was referred by her optometrist to see an ophthalmologist. Has been taking Flarex prescribed by Dr. Belcher for last few months for "inflamation"   - no need for additional imaging   - no need for LP   - discontinue Flarex drops until ophthal f/u   - Trial of Depakote 250mg TID on discharge   - follow up with ophthalmologist  - f/u rheum outpt   - f/u with Dr. Warner within 2 weeks of discharge

## 2022-03-21 NOTE — DISCHARGE NOTE PROVIDER - CARE PROVIDER_API CALL
August Rubalcava)  Neurology  [address], [address]  [place] [address]  Phone: [phone]  Fax: [phone]  Follow Up Time: 1 week    Jonathan Woods)  Ophthalmology  [address] [place] [address]  Phone: [phone]  Fax: [phone]  Follow Up Time: 1 week    Karishma Alejandra ()  Internal Medicine  [address] [place] [address]  Phone: [phone]  Fax: [phone]  Follow Up Time: 2 weeks

## 2022-03-21 NOTE — DISCHARGE NOTE NURSING/CASE MANAGEMENT/SOCIAL WORK - NSDCPEFALRISK_GEN_ALL_CORE
For information on Fall & Injury Prevention, visit: [email or web address] OR  [email or web address] OR  https://www.cdc.gov/steadi/patient.html

## 2022-03-21 NOTE — DISCHARGE NOTE PROVIDER - HOSPITAL COURSE
52 year old F lady with PMH of psoriasis, elevated GUERLINE, following with rheumatology recently, presented for intermitted bilateral eye pain.  Symptoms has been going on for almost a year, worsening over the last 2 months ago. Patient follows with optometrist Dr. Belcher at [facility], started on Flarex eye drops 0.1 %. This morning, patient's pain was very severe, woke her up from sleep 20/10 in intensity associated with nausea headache, and diplopia. She also had bilateral numbness over cheeks and the tip of her  nose.  When seen in ED, pain is better, 8/10 in intensity, associated with photophobia. Patient also mentioned she feels her vision was blurry, and her ophthalmologist attribute that to Flarex which she was tapering over the last 2 months. her headache was getting better.  She denies weakness, tingling, numbness, gait changes, bowel or bladder symptoms.    Pt admitted to neurology of concern for optic neuritis. CTH, CTA head/neck done in ED, unremarkable. MR limited by pt unable to tolerate contrast portion of exam however on MR Head/orbit no evidence of intracranial path or gross abnormal signal involving the optic nerves though evaluation of orbit suboptimal without intravenous contrast. Started on depakote 500mg x3 doses with improvement in pain present on admission.     #BL intermittent eye pain 2/2 glaucoma vs headache syndrome   >Pt with h/o intermittent BL eye pain over last 2 months, following with optho   >Pt presented with 20/10 eye pain, nausea, HA, diplopia, BL numbness over cheeks and tip of nose   >CT head and CTA head/neck done in ED unremarkable   >On exam 3/21, previously noted facial numbness resolved, did not appreciate previously noted APD   >Pt unable to complete contrast portion of MR however noncontrast MR showing no evidence of mass, stroke, or optic neuritis   >Pt seen by Dr. Belcher at [facility] who is a optometrist however was referred by her optometrist to see an ophthalmologist. Has been taking Flarex prescribed by Dr. Belcher for last few months for "inflammation"   - no need for additional imaging   - no need for LP   - discontinue Flarex drops until ophthalmology f/u   - Trial of Depakote 250mg TID on discharge   - follow up with ophthalmologist  - f/u rheum outpt   - f/u with Dr. Warner within 2 weeks of discharge        52 year old F lady with PMH of psoriasis, elevated GUERLINE, following with rheumatology recently, presented for intermitted bilateral eye pain.  Symptoms has been going on for almost a year, worsening over the last 2 months ago. Patient follows with optometrist Dr. Belcher at [facility], started on Flarex eye drops 0.1 %. This morning, patient's pain was very severe, woke her up from sleep 20/10 in intensity associated with nausea headache, and diplopia. She also had bilateral numbness over cheeks and the tip of her  nose.  When seen in ED, pain is better, 8/10 in intensity, associated with photophobia. Patient also mentioned she feels her vision was blurry, and her ophthalmologist attribute that to Flarex which she was tapering over the last 2 months. her headache was getting better.  She denies weakness, tingling, numbness, gait changes, bowel or bladder symptoms.    Pt admitted to neurology of concern for optic neuritis. CTH, CTA head/neck done in ED, unremarkable. MR limited by pt unable to tolerate contrast portion of exam however on MR Head/orbit no evidence of intracranial path or gross abnormal signal involving the optic nerves though evaluation of orbit suboptimal without intravenous contrast. Started on depakote 500mg x3 doses with improvement in pain present on admission.     #BL intermittent eye pain 2/2 glaucoma vs headache syndrome   >Pt with h/o intermittent BL eye pain over last 2 months, following with optho   >Pt presented with 20/10 eye pain, nausea, HA, diplopia, BL numbness over cheeks and tip of nose   >CT head and CTA head/neck done in ED unremarkable   >On exam 3/21, previously noted facial numbness resolved, did not appreciate previously noted APD   >Pt unable to complete contrast portion of MR however noncontrast MR showing no evidence of mass, stroke, or optic neuritis   >Pt seen by Dr. Belcher at [facility] who is a optometrist however was referred by her optometrist to see an ophthalmologist. Has been taking Flarex prescribed by Dr. Belcher for last few months for "inflammation"   - no need for additional imaging   - no need for LP   - discontinue Flarex drops until ophthalmology f/u   - Trial of Depakote 250mg TID on discharge   - follow up with ophthalmologist outpt   - f/u rheum outpt   - f/u with Dr. Warner within 2 weeks of discharge        52 year old F lady with PMH of psoriasis, elevated GUERLINE, following with rheumatology recently, presented for intermitted bilateral eye pain.  Symptoms has been going on for almost a year, worsening over the last 2 months ago. Patient follows with optometrist Dr. Belcher at [facility], started on Flarex eye drops 0.1 %. This morning, patient's pain was very severe, woke her up from sleep 20/10 in intensity associated with nausea headache, and diplopia. She also had bilateral numbness over cheeks and the tip of her  nose.  When seen in ED, pain is better, 8/10 in intensity, associated with photophobia. Patient also mentioned she feels her vision was blurry, and her optometrist attribute that to Flarex which she was tapering over the last 2 months. her headache was getting better.  She denies weakness, tingling, numbness, gait changes, bowel or bladder symptoms.    Pt admitted to neurology of concern for optic neuritis. CTH, CTA head/neck done in ED, unremarkable. MR limited by pt unable to tolerate contrast portion of exam however on MR Head/orbit no evidence of intracranial path or gross abnormal signal involving the optic nerves though evaluation of orbit suboptimal without intravenous contrast. Started on depakote 500mg x3 doses with improvement in pain present on admission.  Doubt neurologic with no focal neurologic deficits and negative neurologic w/u.  Suspect rheumatologic etiology vs ophthalmologic vs medication side effect.      #BL intermittent eye pain 2/2 glaucoma vs headache syndrome   >Pt with h/o intermittent BL eye pain over last 2 months, following with optho   >Pt presented with 20/10 eye pain, nausea, HA, diplopia, BL numbness over cheeks and tip of nose   >CT head and CTA head/neck done in ED unremarkable   >On exam 3/21, previously noted facial numbness resolved, did not appreciate previously noted APD   >Pt unable to complete contrast portion of MR however noncontrast MR showing no evidence of mass, stroke, or optic nerve edema  >Pt seen by optometrist prescribed Flarex for last few months for "inflammation"   - no need for additional imaging   - no need for LP at this time  - discontinue Flarex drops until ophthalmology f/u   - Trial of Depakote 250mg TID on discharge   - follow up with ophthalmologist outpt   - f/u rheum outpt   - f/u with Dr. Warner within 2 weeks of discharge

## 2022-03-24 DIAGNOSIS — H40.9 UNSPECIFIED GLAUCOMA: ICD-10-CM

## 2022-03-24 DIAGNOSIS — Z88.0 ALLERGY STATUS TO PENICILLIN: ICD-10-CM

## 2022-03-24 DIAGNOSIS — Z82.49 FAMILY HISTORY OF ISCHEMIC HEART DISEASE AND OTHER DISEASES OF THE CIRCULATORY SYSTEM: ICD-10-CM

## 2022-03-24 DIAGNOSIS — H57.13 OCULAR PAIN, BILATERAL: ICD-10-CM

## 2022-03-24 DIAGNOSIS — L40.9 PSORIASIS, UNSPECIFIED: ICD-10-CM

## 2022-07-15 PROBLEM — L40.9 PSORIASIS, UNSPECIFIED: Chronic | Status: ACTIVE | Noted: 2022-03-19

## 2022-09-27 ENCOUNTER — APPOINTMENT (OUTPATIENT)
Dept: UROLOGY | Facility: CLINIC | Age: 52
End: 2022-09-27

## 2022-09-27 PROBLEM — Z00.00 ENCOUNTER FOR PREVENTIVE HEALTH EXAMINATION: Status: ACTIVE | Noted: 2022-09-27

## 2022-12-27 ENCOUNTER — APPOINTMENT (OUTPATIENT)
Dept: GASTROENTEROLOGY | Facility: CLINIC | Age: 52
End: 2022-12-27

## 2023-06-01 ENCOUNTER — OUTPATIENT (OUTPATIENT)
Dept: OUTPATIENT SERVICES | Facility: HOSPITAL | Age: 53
LOS: 1 days | End: 2023-06-01
Payer: COMMERCIAL

## 2023-06-01 DIAGNOSIS — R22.9 LOCALIZED SWELLING, MASS AND LUMP, UNSPECIFIED: ICD-10-CM

## 2023-06-01 DIAGNOSIS — Z78.9 OTHER SPECIFIED HEALTH STATUS: Chronic | ICD-10-CM

## 2023-06-01 DIAGNOSIS — Z00.8 ENCOUNTER FOR OTHER GENERAL EXAMINATION: ICD-10-CM

## 2023-06-01 PROCEDURE — 76882 US LMTD JT/FCL EVL NVASC XTR: CPT | Mod: LT

## 2023-06-01 PROCEDURE — 76882 US LMTD JT/FCL EVL NVASC XTR: CPT | Mod: 26,LT

## 2023-06-02 DIAGNOSIS — R22.9 LOCALIZED SWELLING, MASS AND LUMP, UNSPECIFIED: ICD-10-CM

## 2024-01-19 ENCOUNTER — APPOINTMENT (OUTPATIENT)
Dept: ORTHOPEDIC SURGERY | Facility: CLINIC | Age: 54
End: 2024-01-19
Payer: COMMERCIAL

## 2024-01-19 VITALS — WEIGHT: 162 LBS | BODY MASS INDEX: 27.66 KG/M2 | HEIGHT: 64 IN

## 2024-01-19 DIAGNOSIS — Z87.2 PERSONAL HISTORY OF DISEASES OF THE SKIN AND SUBCUTANEOUS TISSUE: ICD-10-CM

## 2024-01-19 DIAGNOSIS — M65.4 RADIAL STYLOID TENOSYNOVITIS [DE QUERVAIN]: ICD-10-CM

## 2024-01-19 PROCEDURE — 73110 X-RAY EXAM OF WRIST: CPT | Mod: LT

## 2024-01-19 PROCEDURE — 99213 OFFICE O/P EST LOW 20 MIN: CPT | Mod: 25

## 2024-01-19 PROCEDURE — 20600 DRAIN/INJ JOINT/BURSA W/O US: CPT | Mod: LT

## 2024-01-19 PROCEDURE — L3809: CPT | Mod: LT

## 2024-01-19 RX ORDER — NAPROXEN 500 MG/1
500 TABLET ORAL
Qty: 60 | Refills: 0 | Status: ACTIVE | COMMUNITY
Start: 2024-01-19 | End: 1900-01-01

## 2024-03-05 ENCOUNTER — APPOINTMENT (OUTPATIENT)
Dept: ORTHOPEDIC SURGERY | Facility: CLINIC | Age: 54
End: 2024-03-05

## 2024-04-25 ENCOUNTER — NON-APPOINTMENT (OUTPATIENT)
Age: 54
End: 2024-04-25

## 2024-05-06 ENCOUNTER — TRANSCRIPTION ENCOUNTER (OUTPATIENT)
Age: 54
End: 2024-05-06

## 2024-08-26 ENCOUNTER — OUTPATIENT (OUTPATIENT)
Dept: OUTPATIENT SERVICES | Facility: HOSPITAL | Age: 54
LOS: 1 days | End: 2024-08-26
Payer: COMMERCIAL

## 2024-08-26 DIAGNOSIS — Z78.9 OTHER SPECIFIED HEALTH STATUS: Chronic | ICD-10-CM

## 2024-08-26 DIAGNOSIS — R07.9 CHEST PAIN, UNSPECIFIED: ICD-10-CM

## 2024-08-26 PROCEDURE — 71046 X-RAY EXAM CHEST 2 VIEWS: CPT | Mod: 26

## 2024-08-26 PROCEDURE — 71046 X-RAY EXAM CHEST 2 VIEWS: CPT

## 2024-08-27 DIAGNOSIS — R07.9 CHEST PAIN, UNSPECIFIED: ICD-10-CM

## 2025-02-28 ENCOUNTER — NON-APPOINTMENT (OUTPATIENT)
Age: 55
End: 2025-02-28

## 2025-02-28 ENCOUNTER — APPOINTMENT (OUTPATIENT)
Facility: CLINIC | Age: 55
End: 2025-02-28
Payer: COMMERCIAL

## 2025-02-28 PROCEDURE — 99214 OFFICE O/P EST MOD 30 MIN: CPT

## 2025-02-28 PROCEDURE — 92201 OPSCPY EXTND RTA DRAW UNI/BI: CPT

## 2025-02-28 PROCEDURE — 92020 GONIOSCOPY: CPT

## 2025-02-28 PROCEDURE — 92133 CPTRZD OPH DX IMG PST SGM ON: CPT

## 2025-03-03 NOTE — ED CDU PROVIDER DISPOSITION NOTE - NS ED MD [facility] YN
CM left a voicemail message for patient's mother requesting a call back to discuss discharge plans.    Yes

## 2025-07-03 ENCOUNTER — NON-APPOINTMENT (OUTPATIENT)
Age: 55
End: 2025-07-03